# Patient Record
Sex: MALE | Race: WHITE | NOT HISPANIC OR LATINO | Employment: OTHER | URBAN - NONMETROPOLITAN AREA
[De-identification: names, ages, dates, MRNs, and addresses within clinical notes are randomized per-mention and may not be internally consistent; named-entity substitution may affect disease eponyms.]

---

## 2023-05-25 ENCOUNTER — TELEPHONE (OUTPATIENT)
Dept: FAMILY MEDICINE CLINIC | Facility: CLINIC | Age: 73
End: 2023-05-25

## 2023-06-01 ENCOUNTER — OFFICE VISIT (OUTPATIENT)
Dept: FAMILY MEDICINE CLINIC | Facility: CLINIC | Age: 73
End: 2023-06-01

## 2023-06-01 ENCOUNTER — PATIENT ROUNDING (BHMG ONLY) (OUTPATIENT)
Dept: FAMILY MEDICINE CLINIC | Facility: CLINIC | Age: 73
End: 2023-06-01

## 2023-06-01 VITALS
HEART RATE: 85 BPM | BODY MASS INDEX: 37.94 KG/M2 | OXYGEN SATURATION: 95 % | TEMPERATURE: 97.5 F | DIASTOLIC BLOOD PRESSURE: 94 MMHG | WEIGHT: 271 LBS | RESPIRATION RATE: 18 BRPM | HEIGHT: 71 IN | SYSTOLIC BLOOD PRESSURE: 152 MMHG

## 2023-06-01 DIAGNOSIS — M1A.9XX0 CHRONIC GOUT WITHOUT TOPHUS, UNSPECIFIED CAUSE, UNSPECIFIED SITE: ICD-10-CM

## 2023-06-01 DIAGNOSIS — M06.9 RHEUMATOID ARTHRITIS, INVOLVING UNSPECIFIED SITE, UNSPECIFIED WHETHER RHEUMATOID FACTOR PRESENT: ICD-10-CM

## 2023-06-01 DIAGNOSIS — Z76.89 ENCOUNTER TO ESTABLISH CARE: ICD-10-CM

## 2023-06-01 DIAGNOSIS — Z00.00 ANNUAL PHYSICAL EXAM: ICD-10-CM

## 2023-06-01 DIAGNOSIS — Z12.11 COLON CANCER SCREENING: ICD-10-CM

## 2023-06-01 DIAGNOSIS — E78.5 HYPERLIPIDEMIA, UNSPECIFIED HYPERLIPIDEMIA TYPE: ICD-10-CM

## 2023-06-01 DIAGNOSIS — I10 ESSENTIAL HYPERTENSION: Primary | ICD-10-CM

## 2023-06-01 RX ORDER — CLOPIDOGREL BISULFATE 75 MG/1
1 TABLET ORAL DAILY
COMMUNITY

## 2023-06-01 RX ORDER — LANOLIN ALCOHOL/MO/W.PET/CERES
2500 CREAM (GRAM) TOPICAL DAILY
COMMUNITY

## 2023-06-01 RX ORDER — ATORVASTATIN CALCIUM 40 MG/1
1 TABLET, FILM COATED ORAL DAILY
COMMUNITY
Start: 2023-05-30

## 2023-06-01 RX ORDER — MULTIVIT WITH MINERALS/LUTEIN
500 TABLET ORAL DAILY
COMMUNITY

## 2023-06-01 RX ORDER — MILK THISTLE 150 MG
355 CAPSULE ORAL DAILY
COMMUNITY

## 2023-06-01 RX ORDER — MAGNESIUM OXIDE 400 MG/1
400 TABLET ORAL DAILY
COMMUNITY

## 2023-06-01 RX ORDER — GABAPENTIN 300 MG/1
1 CAPSULE ORAL 3 TIMES DAILY
COMMUNITY
Start: 2023-05-12

## 2023-06-01 RX ORDER — TRAMADOL HYDROCHLORIDE 50 MG/1
50 TABLET ORAL 3 TIMES DAILY
COMMUNITY
Start: 2023-05-12

## 2023-06-01 RX ORDER — ZINC GLUCONATE 50 MG
50 TABLET ORAL DAILY
COMMUNITY

## 2023-06-01 RX ORDER — MULTIVIT-MIN/FOLIC AC/COLLAGEN 0.2MG-25MG
2360 TABLET,CHEWABLE ORAL DAILY
COMMUNITY

## 2023-06-01 RX ORDER — ALLOPURINOL 100 MG/1
100 TABLET ORAL DAILY
COMMUNITY

## 2023-06-01 RX ORDER — DIPHENOXYLATE HYDROCHLORIDE AND ATROPINE SULFATE 2.5; .025 MG/1; MG/1
TABLET ORAL DAILY
COMMUNITY

## 2023-06-01 RX ORDER — CARVEDILOL 6.25 MG/1
6.25 TABLET ORAL 2 TIMES DAILY WITH MEALS
COMMUNITY

## 2023-06-01 RX ORDER — TORSEMIDE 100 MG/1
1 TABLET ORAL DAILY
COMMUNITY
Start: 2022-06-08

## 2023-06-01 RX ORDER — ALLOPURINOL 300 MG/1
300 TABLET ORAL DAILY
COMMUNITY

## 2023-06-01 NOTE — PROGRESS NOTES
June 1, 2023    Hello, may I speak with Fer Ch?    My name is Kendell Stanley    I am  with [facility]  [address] [address] [phone].    Before we get started may I verify your date of birth? 1950    I am calling to officially welcome you to our practice and ask about your recent visit. Is this a good time to talk?     YES - I spoke with this patient at check out.    Tell me about your visit with us. What things went well?    Patient said the staff was nice and his wait time was very quick. He said he only lives 5 minutes from the office and is glad he does not have to travel to [place] to see a [facility] doctor. He was very happy with his visit with KELSEY Frias.       We're always looking for ways to make our patients' experiences even better. Do you have recommendations on ways we may improve?      NO    Overall were you satisfied with your first visit to our practice?     YES       I appreciate you taking the time to speak with me today. Is there anything else I can do for you?     NO      Thank you, and have a great day.

## 2023-06-01 NOTE — PROGRESS NOTES
"Chief Complaint   Establish Care (Cont. Care of HTN; hyperlipidemia; chronic pain; gout)    Subjective        Fer Ch presents to [facility] to establish care (annual physical).    Hypertension  This is a chronic problem. Episode onset: 20 years or longer. The problem has been waxing and waning since onset. The problem is uncontrolled. Associated symptoms include headaches, peripheral edema and shortness of breath. Pertinent negatives include no anxiety, blurred vision, chest pain, malaise/fatigue, neck pain, orthopnea, palpitations, PND or sweats. Risk factors for coronary artery disease include male gender, obesity, sedentary lifestyle and dyslipidemia. There is no history of chronic renal disease.   Hyperlipidemia  This is a chronic problem. The current episode started more than 1 year ago. The problem is uncontrolled. Recent lipid tests were reviewed and are high. Exacerbating diseases include obesity. He has no history of chronic renal disease, diabetes, hypothyroidism, liver disease or nephrotic syndrome. Associated symptoms include myalgias and shortness of breath. Pertinent negatives include no chest pain.   Pain  This is a chronic (Arthritis pain (dx with RA)) problem. Episode onset: 20 years or longer. The problem occurs constantly. The problem has been gradually worsening. Associated symptoms include arthralgias, a change in bowel habit, headaches, joint swelling, myalgias and numbness. Pertinent negatives include no abdominal pain, anorexia, chest pain, chills, congestion, coughing, diaphoresis, fatigue, fever, nausea, neck pain, rash, sore throat, swollen glands, urinary symptoms, vertigo, visual change, vomiting or weakness. 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   Other  This is a chronic (Gout) problem. Episode onset: \"years\" The problem has been waxing and waning. Associated symptoms include arthralgias, a " "change in bowel habit, headaches, joint swelling, myalgias and numbness. Pertinent negatives include no abdominal pain, anorexia, chest pain, chills, congestion, coughing, diaphoresis, fatigue, fever, nausea, neck pain, rash, sore throat, swollen glands, urinary symptoms, vertigo, visual change, vomiting or weakness.     Objective   Vital Signs:  /94 (BP Location: Right arm, Patient Position: Sitting, Cuff Size: Adult)   Pulse 85   Temp 97.5 °F (36.4 °C) (Temporal)   Resp 18   Ht 180.3 cm (71\")   Wt 123 kg (271 lb)   SpO2 95%   BMI 37.80 kg/m²   Estimated body mass index is 37.8 kg/m² as calculated from the following:    Height as of this encounter: 180.3 cm (71\").    Weight as of this encounter: 123 kg (271 lb).       Class 2 Severe Obesity (BMI >=35 and <=39.9). Obesity-related health conditions include the following: hypertension, dyslipidemias and GERD. Obesity is unchanged. BMI is is above average; BMI management plan is completed. We discussed portion control and increasing exercise.      Physical Exam  Vitals and nursing note reviewed.   Constitutional:       General: He is awake.      Appearance: Normal appearance.   HENT:      Head: Normocephalic.      Right Ear: Tympanic membrane, ear canal and external ear normal. Decreased hearing noted.      Left Ear: Tympanic membrane, ear canal and external ear normal. Decreased hearing noted.      Nose: Nose normal.      Mouth/Throat:      Lips: Pink.      Mouth: Mucous membranes are moist.      Pharynx: Oropharynx is clear.   Eyes:      General: Lids are normal.      Conjunctiva/sclera: Conjunctivae normal.      Pupils: Pupils are equal, round, and reactive to light.   Cardiovascular:      Rate and Rhythm: Normal rate and regular rhythm.      Heart sounds: Normal heart sounds.   Pulmonary:      Effort: Pulmonary effort is normal.      Breath sounds: Normal breath sounds.   Abdominal:      General: Abdomen is protuberant. Bowel sounds are normal.      " Palpations: Abdomen is soft.      Tenderness: There is no abdominal tenderness.   Musculoskeletal:      Right hand: Tenderness present. Decreased range of motion.      Left hand: Tenderness present. Decreased range of motion.      Cervical back: Normal range of motion and neck supple.      Lumbar back: Spasms and tenderness present.      Right knee: Tenderness present.      Left knee: Tenderness present.   Skin:     General: Skin is warm and dry.      Capillary Refill: Capillary refill takes less than 2 seconds.   Neurological:      Mental Status: He is alert and oriented to person, place, and time.      Cranial Nerves: Cranial nerves 2-12 are intact.      Sensory: Sensation is intact.      Motor: Motor function is intact.      Coordination: Coordination is intact.      Gait: Gait is intact.   Psychiatric:         Attention and Perception: Attention and perception normal.         Mood and Affect: Mood is anxious. Affect is flat.         Speech: Speech normal.         Behavior: Behavior is agitated. Behavior is cooperative.         Thought Content: Thought content normal.         Cognition and Memory: Cognition normal.         Judgment: Judgment normal.        Result Review :  The following data was reviewed by: KELSEY Webb on 06/01/2023:  CMP        9/7/2022    13:58 3/7/2023    14:27   CMP   Total Protein 7.2      7.6        Albumin 4.5      4.7        Total Bilirubin 0.6      0.5        Alkaline Phosphatase 93      86        AST (SGOT) 20      26        ALT (SGPT) 22      41            This result is from an external source.     CBC w/diff        11/22/2022    11:45 3/7/2023    14:27   CBC w/Diff   WBC 8.32      9.38        RBC 3.94      3.98        Hemoglobin 14.7      14.8        Hematocrit 41.2      41.6              105        MCH 37.3      37.2        MCHC 35.7      35.6        RDW 13.0      13.7        Platelets 211      218        Neutrophil Rel % 58.0      61.0        Immature Granulocyte  Rel % 1.0      1.0        Lymphocyte Rel % 25.0      22.0        Monocyte Rel % 11.0      13.0        Eosinophil Rel % 4.0      2.0        Basophil Rel % 1.0      1.0            This result is from an external source.           Assessment and Plan   Diagnoses and all orders for this visit:    1. Essential hypertension (Primary)  -     Ambulatory Referral to Cardiology  -     CBC w AUTO Differential; Future  -     Comprehensive metabolic panel; Future  -     Lipid panel; Future  -     TSH; Future    2. Annual physical exam    3. Colon cancer screening  -     Cologuard - Stool, Per Rectum; Future    4. Encounter to establish care    5. Chronic gout without tophus, unspecified cause, unspecified site  -     Uric acid; Future    6. Hyperlipidemia, unspecified hyperlipidemia type  -     Lipid panel; Future    7. Rheumatoid arthritis, involving unspecified site, unspecified whether rheumatoid factor present  -     Ambulatory Referral to Rheumatology         The preventive exam has been reviewed in detail.  The patient has been fully counseled on preventative guidelines for vaccines, cancer screenings, and other health maintenance needs.   The patient has been counseled on guidelines for maintaining a lifestyle to promote good health and to minimize chronic diseases.  The patient has been assisted with scheduling these healthcare procedures for the coming year and given a written document of health maintenance and anticipatory guidance for age with the AVS.    I spent 30 minutes caring for Fer on this date of service. This time includes time spent by me in the following activities:preparing for the visit, reviewing tests, obtaining and/or reviewing a separately obtained history, performing a medically appropriate examination and/or evaluation , counseling and educating the patient/family/caregiver, ordering medications, tests, or procedures, referring and communicating with other health care professionals , documenting  information in the medical record, independently interpreting results and communicating that information with the patient/family/caregiver and care coordination     Follow Up   Return in about 3 months (around 9/1/2023) for Medicare Wellness.  Patient was given instructions and counseling regarding his condition or for health maintenance advice. Please see specific information pulled into the AVS if appropriate.       This document has been electronically signed by KELSEY Webb  June 2, 2023 21:21 EDT

## 2023-06-02 PROBLEM — M06.9 RHEUMATOID ARTHRITIS: Status: ACTIVE | Noted: 2023-06-02

## 2023-06-03 NOTE — PATIENT INSTRUCTIONS
"Hypertension, Adult  High blood pressure (hypertension) is when the force of blood pumping through the arteries is too strong. The arteries are the blood vessels that carry blood from the heart throughout the body. Hypertension forces the heart to work harder to pump blood and may cause arteries to become narrow or stiff. Untreated or uncontrolled hypertension can cause a heart attack, heart failure, a stroke, kidney disease, and other problems.  A blood pressure reading consists of a higher number over a lower number. Ideally, your blood pressure should be below 120/80. The first (\"top\") number is called the systolic pressure. It is a measure of the pressure in your arteries as your heart beats. The second (\"bottom\") number is called the diastolic pressure. It is a measure of the pressure in your arteries as the heart relaxes.  What are the causes?  The exact cause of this condition is not known. There are some conditions that result in or are related to high blood pressure.  What increases the risk?  Some risk factors for high blood pressure are under your control. The following factors may make you more likely to develop this condition:  Smoking.  Having type 2 diabetes mellitus, high cholesterol, or both.  Not getting enough exercise or physical activity.  Being overweight.  Having too much fat, sugar, calories, or salt (sodium) in your diet.  Drinking too much alcohol.  Some risk factors for high blood pressure may be difficult or impossible to change. Some of these factors include:  Having chronic kidney disease.  Having a family history of high blood pressure.  Age. Risk increases with age.  Race. You may be at higher risk if you are .  Gender. Men are at higher risk than women before age 45. After age 65, women are at higher risk than men.  Having obstructive sleep apnea.  Stress.  What are the signs or symptoms?  High blood pressure may not cause symptoms. Very high blood pressure " (hypertensive crisis) may cause:  Headache.  Anxiety.  Shortness of breath.  Nosebleed.  Nausea and vomiting.  Vision changes.  Severe chest pain.  Seizures.  How is this diagnosed?  This condition is diagnosed by measuring your blood pressure while you are seated, with your arm resting on a flat surface, your legs uncrossed, and your feet flat on the floor. The cuff of the blood pressure monitor will be placed directly against the skin of your upper arm at the level of your heart. It should be measured at least twice using the same arm. Certain conditions can cause a difference in blood pressure between your right and left arms.  Certain factors can cause blood pressure readings to be lower or higher than normal for a short period of time:  When your blood pressure is higher when you are in a health care provider's office than when you are at home, this is called white coat hypertension. Most people with this condition do not need medicines.  When your blood pressure is higher at home than when you are in a health care provider's office, this is called masked hypertension. Most people with this condition may need medicines to control blood pressure.  If you have a high blood pressure reading during one visit or you have normal blood pressure with other risk factors, you may be asked to:  Return on a different day to have your blood pressure checked again.  Monitor your blood pressure at home for 1 week or longer.  If you are diagnosed with hypertension, you may have other blood or imaging tests to help your health care provider understand your overall risk for other conditions.  How is this treated?  This condition is treated by making healthy lifestyle changes, such as eating healthy foods, exercising more, and reducing your alcohol intake. Your health care provider may prescribe medicine if lifestyle changes are not enough to get your blood pressure under control, and if:  Your systolic blood pressure is above  130.  Your diastolic blood pressure is above 80.  Your personal target blood pressure may vary depending on your medical conditions, your age, and other factors.  Follow these instructions at home:  Eating and drinking    Eat a diet that is high in fiber and potassium, and low in sodium, added sugar, and fat. An example eating plan is called the DASH (Dietary Approaches to Stop Hypertension) diet. To eat this way:  Eat plenty of fresh fruits and vegetables. Try to fill one half of your plate at each meal with fruits and vegetables.  Eat whole grains, such as whole-wheat pasta, brown rice, or whole-grain bread. Fill about one fourth of your plate with whole grains.  Eat or drink low-fat dairy products, such as skim milk or low-fat yogurt.  Avoid fatty cuts of meat, processed or cured meats, and poultry with skin. Fill about one fourth of your plate with lean proteins, such as fish, chicken without skin, beans, eggs, or tofu.  Avoid pre-made and processed foods. These tend to be higher in sodium, added sugar, and fat.  Reduce your daily sodium intake. Most people with hypertension should eat less than 1,500 mg of sodium a day.  Do not drink alcohol if:  Your health care provider tells you not to drink.  You are pregnant, may be pregnant, or are planning to become pregnant.  If you drink alcohol:  Limit how much you use to:  0-1 drink a day for women.  0-2 drinks a day for men.  Be aware of how much alcohol is in your drink. In the [place] one drink equals one 12 oz bottle of beer (355 mL), one 5 oz glass of wine (148 mL), or one 1½ oz glass of hard liquor (44 mL).  Lifestyle    Work with your health care provider to maintain a healthy body weight or to lose weight. Ask what an ideal weight is for you.  Get at least 30 minutes of exercise most days of the week. Activities may include walking, swimming, or biking.  Include exercise to strengthen your muscles (resistance exercise), such as Pilates or lifting weights, as  part of your weekly exercise routine. Try to do these types of exercises for 30 minutes at least 3 days a week.  Do not use any products that contain nicotine or tobacco, such as cigarettes, e-cigarettes, and chewing tobacco. If you need help quitting, ask your health care provider.  Monitor your blood pressure at home as told by your health care provider.  Keep all follow-up visits as told by your health care provider. This is important.  Medicines  Take over-the-counter and prescription medicines only as told by your health care provider. Follow directions carefully. Blood pressure medicines must be taken as prescribed.  Do not skip doses of blood pressure medicine. Doing this puts you at risk for problems and can make the medicine less effective.  Ask your health care provider about side effects or reactions to medicines that you should watch for.  Contact a health care provider if you:  Think you are having a reaction to a medicine you are taking.  Have headaches that keep coming back (recurring).  Feel dizzy.  Have swelling in your ankles.  Have trouble with your vision.  Get help right away if you:  Develop a severe headache or confusion.  Have unusual weakness or numbness.  Feel faint.  Have severe pain in your chest or abdomen.  Vomit repeatedly.  Have trouble breathing.  Summary  Hypertension is when the force of blood pumping through your arteries is too strong. If this condition is not controlled, it may put you at risk for serious complications.  Your personal target blood pressure may vary depending on your medical conditions, your age, and other factors. For most people, a normal blood pressure is less than 120/80.  Hypertension is treated with lifestyle changes, medicines, or a combination of both. Lifestyle changes include losing weight, eating a healthy, low-sodium diet, exercising more, and limiting alcohol.  This information is not intended to replace advice given to you by your health care  provider. Make sure you discuss any questions you have with your health care provider.  Document Revised: 08/28/2019 Document Reviewed: 08/28/2019  [employer] Patient Education © 2022 [employer] High Cholesterol  High cholesterol is a condition in which the blood has high levels of a white, waxy substance similar to fat (cholesterol). The liver makes all the cholesterol that the body needs. The human body needs small amounts of cholesterol to help build cells. A person gets extra or excess cholesterol from the food that he or she eats.  The blood carries cholesterol from the liver to the rest of the body. If you have high cholesterol, deposits (plaques) may build up on the walls of your arteries. Arteries are the blood vessels that carry blood away from your heart. These plaques make the arteries narrow and stiff.  Cholesterol plaques increase your risk for heart attack and stroke. Work with your health care provider to keep your cholesterol levels in a healthy range.  What increases the risk?  The following factors may make you more likely to develop this condition:  Eating foods that are high in animal fat (saturated fat) or cholesterol.  Being overweight.  Not getting enough exercise.  A family history of high cholesterol (familial hypercholesterolemia).  Use of tobacco products.  Having diabetes.  What are the signs or symptoms?  In most cases, high cholesterol does not usually cause any symptoms.  In severe cases, very high cholesterol levels can cause:  Fatty bumps under the skin (xanthomas).  A white or gray ring around the black center (pupil) of the eye.  How is this diagnosed?  This condition may be diagnosed based on the results of a blood test.  If you are older than 20 years of age, your health care provider may check your cholesterol levels every 4-6 years.  You may be checked more often if you have high cholesterol or other risk factors for heart disease.  The blood test for cholesterol  "measures:  \"Bad\" cholesterol, or LDL cholesterol. This is the main type of cholesterol that causes heart disease. The desired level is less than 100 mg/dL (2.59 mmol/L).  \"Good\" cholesterol, or HDL cholesterol. HDL helps protect against heart disease by cleaning the arteries and carrying the LDL to the liver for processing. The desired level for HDL is 60 mg/dL (1.55 mmol/L) or higher.  Triglycerides. These are fats that your body can store or burn for energy. The desired level is less than 150 mg/dL (1.69 mmol/L).  Total cholesterol. This measures the total amount of cholesterol in your blood and includes LDL, HDL, and triglycerides. The desired level is less than 200 mg/dL (5.17 mmol/L).  How is this treated?  Treatment for high cholesterol starts with lifestyle changes, such as diet and exercise.  Diet changes. You may be asked to eat foods that have more fiber and less saturated fats or added sugar.  Lifestyle changes. These may include regular exercise, maintaining a healthy weight, and quitting use of tobacco products.  Medicines. These are given when diet and lifestyle changes have not worked. You may be prescribed a statin medicine to help lower your cholesterol levels.  Follow these instructions at home:  Eating and drinking    Eat a healthy, balanced diet. This diet includes:  Daily servings of a variety of fresh, frozen, or canned fruits and vegetables.  Daily servings of whole grain foods that are rich in fiber.  Foods that are low in saturated fats and trans fats. These include poultry and fish without skin, lean cuts of meat, and low-fat dairy products.  A variety of fish, especially oily fish that contain omega-3 fatty acids. Aim to eat fish at least 2 times a week.  Avoid foods and drinks that have added sugar.  Use healthy cooking methods, such as roasting, grilling, broiling, baking, poaching, steaming, and stir-frying. Do not watkins your food except for stir-frying.  If you drink alcohol:  Limit how " "much you have to:  0-1 drink a day for women who are not pregnant.  0-2 drinks a day for men.  Know how much alcohol is in a drink. In the [place] one drink equals one 12 oz bottle of beer (355 mL), one 5 oz glass of wine (148 mL), or one 1½ oz glass of hard liquor (44 mL).  Lifestyle    Get regular exercise. Aim to exercise for a total of 150 minutes a week. Increase your activity level by doing activities such as gardening, walking, and taking the stairs.  Do not use any products that contain nicotine or tobacco. These products include cigarettes, chewing tobacco, and vaping devices, such as e-cigarettes. If you need help quitting, ask your health care provider.  General instructions  Take over-the-counter and prescription medicines only as told by your health care provider.  Keep all follow-up visits. This is important.  Where to find more information  American Heart Association: www.heart.org  National Heart, Lung, and Blood [place]: www.nhlbi.nih.gov  Contact a health care provider if:  You have trouble achieving or maintaining a healthy diet or weight.  You are starting an exercise program.  You are unable to stop smoking.  Get help right away if:  You have chest pain.  You have trouble breathing.  You have discomfort or pain in your jaw, neck, back, shoulder, or arm.  You have any symptoms of a stroke. \"BE FAST\" is an easy way to remember the main warning signs of a stroke:  B - Balance. Signs are dizziness, sudden trouble walking, or loss of balance.  E - Eyes. Signs are trouble seeing or a sudden change in vision.  F - Face. Signs are sudden weakness or numbness of the face, or the face or eyelid drooping on one side.  A - Arms. Signs are weakness or numbness in an arm. This happens suddenly and usually on one side of the body.  S - Speech. Signs are sudden trouble speaking, slurred speech, or trouble understanding what people say.  T - Time. Time to call emergency services. Write down what time symptoms " started.  You have other signs of a stroke, such as:  A sudden, severe headache with no known cause.  Nausea or vomiting.  Seizure.  These symptoms may represent a serious problem that is an emergency. Do not wait to see if the symptoms will go away. Get medical help right away. Call your local emergency services (911 in the [place] Do not drive yourself to the hospital.  Summary  Cholesterol plaques increase your risk for heart attack and stroke. Work with your health care provider to keep your cholesterol levels in a healthy range.  Eat a healthy, balanced diet, get regular exercise, and maintain a healthy weight.  Do not use any products that contain nicotine or tobacco. These products include cigarettes, chewing tobacco, and vaping devices, such as e-cigarettes.  Get help right away if you have any symptoms of a stroke.  This information is not intended to replace advice given to you by your health care provider. Make sure you discuss any questions you have with your health care provider.  Document Revised: 03/03/2022 Document Reviewed: 02/21/2022  [employer] Patient Education © 2022 [employer]

## 2023-06-06 ENCOUNTER — OFFICE VISIT (OUTPATIENT)
Dept: CARDIOLOGY | Facility: CLINIC | Age: 73
End: 2023-06-06
Payer: MEDICARE

## 2023-06-06 VITALS
HEART RATE: 95 BPM | DIASTOLIC BLOOD PRESSURE: 85 MMHG | WEIGHT: 275 LBS | BODY MASS INDEX: 38.5 KG/M2 | OXYGEN SATURATION: 95 % | HEIGHT: 71 IN | SYSTOLIC BLOOD PRESSURE: 129 MMHG

## 2023-06-06 DIAGNOSIS — R06.02 SHORTNESS OF BREATH: ICD-10-CM

## 2023-06-06 DIAGNOSIS — I25.10 CORONARY ARTERY DISEASE INVOLVING NATIVE CORONARY ARTERY OF NATIVE HEART WITHOUT ANGINA PECTORIS: Primary | ICD-10-CM

## 2023-06-06 DIAGNOSIS — R60.0 BILATERAL LOWER EXTREMITY EDEMA: ICD-10-CM

## 2023-06-06 PROCEDURE — 3074F SYST BP LT 130 MM HG: CPT | Performed by: PHYSICIAN ASSISTANT

## 2023-06-06 PROCEDURE — 99204 OFFICE O/P NEW MOD 45 MIN: CPT | Performed by: PHYSICIAN ASSISTANT

## 2023-06-06 PROCEDURE — 3079F DIAST BP 80-89 MM HG: CPT | Performed by: PHYSICIAN ASSISTANT

## 2023-06-06 NOTE — LETTER
June 6, 2023       No Recipients    Patient: Fer Ch   YOB: 1950   Date of Visit: 6/6/2023       Dear KELSEY Webb    Fer Ch was in my office today. Below is a copy of my note.    If you have questions, please do not hesitate to call me. I look forward to following Fer along with you.         Sincerely,        JOSE DE JESUS Steele        CC:   No Recipients      Problem list     Subjective  Fer Ch is a 73 y.o. male     Chief Complaint   Patient presents with   • Hypertension     ESSENTIAL HTN, REFERRAL FROM [facility]    Problem list  1.  Coronary artery disease  1.  History of stenting x3 in [place], inadequate data  2.  Preserved systolic function  3.  Hypertension  4.  Dyslipidemia    HPI    Patient is a 73-year-old male who presents to the office to be evaluated.  Patient has seen cardiology in the past and has moved to the area and has been here approximately 1 month.    He has done well.  He does not experience any chest pain or pressure.  However, he does get short of breath.  He notices this when trying to do activity but it is mild.  He does not describe PND orthopnea.  He does not describe any palpitations or dysrhythmic symptoms.  He is stable otherwise.      Current Outpatient Medications on File Prior to Visit   Medication Sig Dispense Refill   • allopurinol (ZYLOPRIM) 100 MG tablet Take 1 tablet by mouth Daily.     • allopurinol (ZYLOPRIM) 300 MG tablet Take 1 tablet by mouth Daily.     • ASPIRIN 81 PO Take 1 tablet by mouth Daily.     • atorvastatin (LIPITOR) 40 MG tablet Take 1 tablet by mouth Daily.     • carvedilol (COREG) 6.25 MG tablet Take 1 tablet by mouth 2 (Two) Times a Day With Meals.     • gabapentin (NEURONTIN) 300 MG capsule Take 1 capsule by mouth 3 (Three) Times a Day.     • magnesium oxide (MAG-OX) 400 MG tablet Take 1 tablet by mouth Daily.     • Metoprolol-hydroCHLOROthiazide (DUTOPROL PO) Take 1 tablet by mouth Daily.     •  multivitamin (THERAGRAN) tablet tablet Take  by mouth Daily.     • NON FORMULARY Take 1,000 mg by mouth Daily. Superbeets     • Quercetin 500 MG capsule Take 355 mg by mouth Daily.     • torsemide (DEMADEX) 100 MG tablet Take 1 tablet by mouth Daily.     • traMADol (ULTRAM) 50 MG tablet Take 1 tablet by mouth 3 (Three) Times a Day. for 30 days.     • Turmeric-Larissa 150-25 MG chewable tablet Chew 2,360 mg Daily. Turmeric, Bioerine, Garlic, larissa.     • vitamin B-12 (CYANOCOBALAMIN) 1000 MCG tablet Take 2.5 tablets by mouth Daily.     • vitamin C (ASCORBIC ACID) 250 MG tablet Take 2 tablets by mouth Daily.     • vitamin D3 125 MCG (5000 UT) capsule capsule Take 1 capsule by mouth Daily.     • Zinc 50 MG tablet Take 1 tablet by mouth Daily.     • [DISCONTINUED] clopidogrel (PLAVIX) 75 MG tablet Take 1 tablet by mouth Daily.       No current facility-administered medications on file prior to visit.       Atorvastatin and Potassium-containing compounds    Past Medical History:   Diagnosis Date   • Bladder cancer     Finished tx in 12/2022   • CAD (coronary artery disease)    • Diastolic congestive heart failure    • Dyspnea    • Hypertension    • Knee pain    • Lymphedema        Social History     Socioeconomic History   • Marital status:    Tobacco Use   • Smoking status: Never   • Smokeless tobacco: Never   Substance and Sexual Activity   • Alcohol use: Not Currently   • Drug use: Never   • Sexual activity: Defer       Family History   Problem Relation Age of Onset   • Cancer Father        Review of Systems   Constitutional:  Negative for activity change, appetite change, chills and fever.   HENT: Negative.  Negative for ear discharge, mouth sores, sinus pressure, sinus pain, sneezing and tinnitus.    Eyes:  Negative for pain, redness, itching and visual disturbance.   Respiratory:  Positive for shortness of breath. Negative for cough, choking and wheezing.    Cardiovascular:  Positive for leg swelling.  "Negative for chest pain and palpitations.   Gastrointestinal:  Negative for blood in stool, constipation, diarrhea and nausea.   Genitourinary: Negative.  Negative for difficulty urinating.   Musculoskeletal:  Negative for arthralgias, back pain and neck pain.   Skin:  Negative for rash and wound.   Neurological:  Positive for numbness (HANDS ARE NUMB, LEGS ARE DUMB). Negative for dizziness and weakness.   Psychiatric/Behavioral:  Positive for sleep disturbance.      Objective  Vitals:    06/06/23 1008   BP: 129/85   Pulse: 95   SpO2: 95%   Weight: 125 kg (275 lb)   Height: 180.3 cm (70.98\")      /85   Pulse 95   Ht 180.3 cm (70.98\")   Wt 125 kg (275 lb)   SpO2 95%   BMI 38.37 kg/m²     Lab Results (most recent)       None            Physical Exam  Vitals and nursing note reviewed.   Constitutional:       General: He is not in acute distress.     Appearance: Normal appearance. He is well-developed.   HENT:      Head: Normocephalic and atraumatic.   Eyes:      General: No scleral icterus.        Right eye: No discharge.         Left eye: No discharge.      Conjunctiva/sclera: Conjunctivae normal.   Neck:      Vascular: No carotid bruit.   Cardiovascular:      Rate and Rhythm: Normal rate and regular rhythm.      Heart sounds: Normal heart sounds. No murmur heard.    No friction rub. No gallop.   Pulmonary:      Effort: Pulmonary effort is normal. No respiratory distress.      Breath sounds: Normal breath sounds. No wheezing or rales.   Chest:      Chest wall: No tenderness.   Musculoskeletal:      Right lower leg: Edema present.      Left lower leg: Edema present.   Skin:     General: Skin is warm and dry.      Coloration: Skin is not pale.      Findings: No erythema or rash.   Neurological:      Mental Status: He is alert and oriented to person, place, and time.      Cranial Nerves: No cranial nerve deficit.   Psychiatric:         Behavior: Behavior normal.       Procedure  Procedures       Assessment & " Plan    Problems Addressed this Visit          Cardiac and Vasculature    Coronary artery disease involving native coronary artery of native heart without angina pectoris - Primary    Relevant Orders    Stress Test With Myocardial Perfusion One Day       Pulmonary and Pneumonias    Shortness of breath    Relevant Orders    Stress Test With Myocardial Perfusion One Day       Symptoms and Signs    Bilateral lower extremity edema    Relevant Orders    Stress Test With Myocardial Perfusion One Day     Diagnoses         Codes Comments    Coronary artery disease involving native coronary artery of native heart without angina pectoris    -  Primary ICD-10-CM: I25.10  ICD-9-CM: 414.01     Shortness of breath     ICD-10-CM: R06.02  ICD-9-CM: 786.05     Bilateral lower extremity edema     ICD-10-CM: R60.0  ICD-9-CM: 782.3           Recommendation  1.  Patient is a 73-year-old male who presents to the office for evaluation with history of coronary disease.  Patient feels well but does have a degree of dyspnea.  He had an echocardiogram in April at [facility] and has normal systolic function with no critical valve disease.  I will schedule stress testing to exclude ischemic component of his dyspnea.  Otherwise, he is on antiplatelet and statin therapy.  He has labs performed by primary.    2.  Patient on diuretic therapy to help in regards to lower extremity edema.  We will continue at this time.    3.  We will see patient back for follow-up testing and recommend further.  Follow-up with primary as scheduled.              Fer Ch  reports that he has never smoked. He has never used smokeless tobacco..           Advance Care Planning   ACP discussion was declined by the patient. Patient does not have an advance directive, declines further assistance.               Electronically signed by:

## 2023-06-06 NOTE — PROGRESS NOTES
Problem list     Subjective   Fer Ch is a 73 y.o. male     Chief Complaint   Patient presents with    Hypertension     ESSENTIAL HTN, REFERRAL FROM [facility]    Problem list  1.  Coronary artery disease  1.  History of stenting x3 in [place], inadequate data  2.  Preserved systolic function  3.  Hypertension  4.  Dyslipidemia    HPI    Patient is a 73-year-old male who presents to the office to be evaluated.  Patient has seen cardiology in the past and has moved to the area and has been here approximately 1 month.    He has done well.  He does not experience any chest pain or pressure.  However, he does get short of breath.  He notices this when trying to do activity but it is mild.  He does not describe PND orthopnea.  He does not describe any palpitations or dysrhythmic symptoms.  He is stable otherwise.      Current Outpatient Medications on File Prior to Visit   Medication Sig Dispense Refill    allopurinol (ZYLOPRIM) 100 MG tablet Take 1 tablet by mouth Daily.      allopurinol (ZYLOPRIM) 300 MG tablet Take 1 tablet by mouth Daily.      ASPIRIN 81 PO Take 1 tablet by mouth Daily.      atorvastatin (LIPITOR) 40 MG tablet Take 1 tablet by mouth Daily.      carvedilol (COREG) 6.25 MG tablet Take 1 tablet by mouth 2 (Two) Times a Day With Meals.      gabapentin (NEURONTIN) 300 MG capsule Take 1 capsule by mouth 3 (Three) Times a Day.      magnesium oxide (MAG-OX) 400 MG tablet Take 1 tablet by mouth Daily.      Metoprolol-hydroCHLOROthiazide (DUTOPROL PO) Take 1 tablet by mouth Daily.      multivitamin (THERAGRAN) tablet tablet Take  by mouth Daily.      NON FORMULARY Take 1,000 mg by mouth Daily. Superbeets      Quercetin 500 MG capsule Take 355 mg by mouth Daily.      torsemide (DEMADEX) 100 MG tablet Take 1 tablet by mouth Daily.      traMADol (ULTRAM) 50 MG tablet Take 1 tablet by mouth 3 (Three) Times a Day. for 30 days.      Turmeric-Larissa 150-25 MG chewable tablet Chew 2,360 mg Daily.  Turmeric, Bioerine, Garlic, josephine.      vitamin B-12 (CYANOCOBALAMIN) 1000 MCG tablet Take 2.5 tablets by mouth Daily.      vitamin C (ASCORBIC ACID) 250 MG tablet Take 2 tablets by mouth Daily.      vitamin D3 125 MCG (5000 UT) capsule capsule Take 1 capsule by mouth Daily.      Zinc 50 MG tablet Take 1 tablet by mouth Daily.      [DISCONTINUED] clopidogrel (PLAVIX) 75 MG tablet Take 1 tablet by mouth Daily.       No current facility-administered medications on file prior to visit.       Atorvastatin and Potassium-containing compounds    Past Medical History:   Diagnosis Date    Bladder cancer     Finished tx in 12/2022    CAD (coronary artery disease)     Diastolic congestive heart failure     Dyspnea     Hypertension     Knee pain     Lymphedema        Social History     Socioeconomic History    Marital status:    Tobacco Use    Smoking status: Never    Smokeless tobacco: Never   Substance and Sexual Activity    Alcohol use: Not Currently    Drug use: Never    Sexual activity: Defer       Family History   Problem Relation Age of Onset    Cancer Father        Review of Systems   Constitutional:  Negative for activity change, appetite change, chills and fever.   HENT: Negative.  Negative for ear discharge, mouth sores, sinus pressure, sinus pain, sneezing and tinnitus.    Eyes:  Negative for pain, redness, itching and visual disturbance.   Respiratory:  Positive for shortness of breath. Negative for cough, choking and wheezing.    Cardiovascular:  Positive for leg swelling. Negative for chest pain and palpitations.   Gastrointestinal:  Negative for blood in stool, constipation, diarrhea and nausea.   Genitourinary: Negative.  Negative for difficulty urinating.   Musculoskeletal:  Negative for arthralgias, back pain and neck pain.   Skin:  Negative for rash and wound.   Neurological:  Positive for numbness (HANDS ARE NUMB, LEGS ARE DUMB). Negative for dizziness and weakness.   Psychiatric/Behavioral:   "Positive for sleep disturbance.      Objective   Vitals:    06/06/23 1008   BP: 129/85   Pulse: 95   SpO2: 95%   Weight: 125 kg (275 lb)   Height: 180.3 cm (70.98\")      /85   Pulse 95   Ht 180.3 cm (70.98\")   Wt 125 kg (275 lb)   SpO2 95%   BMI 38.37 kg/m²     Lab Results (most recent)       None            Physical Exam  Vitals and nursing note reviewed.   Constitutional:       General: He is not in acute distress.     Appearance: Normal appearance. He is well-developed.   HENT:      Head: Normocephalic and atraumatic.   Eyes:      General: No scleral icterus.        Right eye: No discharge.         Left eye: No discharge.      Conjunctiva/sclera: Conjunctivae normal.   Neck:      Vascular: No carotid bruit.   Cardiovascular:      Rate and Rhythm: Normal rate and regular rhythm.      Heart sounds: Normal heart sounds. No murmur heard.    No friction rub. No gallop.   Pulmonary:      Effort: Pulmonary effort is normal. No respiratory distress.      Breath sounds: Normal breath sounds. No wheezing or rales.   Chest:      Chest wall: No tenderness.   Musculoskeletal:      Right lower leg: Edema present.      Left lower leg: Edema present.   Skin:     General: Skin is warm and dry.      Coloration: Skin is not pale.      Findings: No erythema or rash.   Neurological:      Mental Status: He is alert and oriented to person, place, and time.      Cranial Nerves: No cranial nerve deficit.   Psychiatric:         Behavior: Behavior normal.       Procedure   Procedures       Assessment & Plan     Problems Addressed this Visit          Cardiac and Vasculature    Coronary artery disease involving native coronary artery of native heart without angina pectoris - Primary    Relevant Orders    Stress Test With Myocardial Perfusion One Day       Pulmonary and Pneumonias    Shortness of breath    Relevant Orders    Stress Test With Myocardial Perfusion One Day       Symptoms and Signs    Bilateral lower extremity edema    " Relevant Orders    Stress Test With Myocardial Perfusion One Day     Diagnoses         Codes Comments    Coronary artery disease involving native coronary artery of native heart without angina pectoris    -  Primary ICD-10-CM: I25.10  ICD-9-CM: 414.01     Shortness of breath     ICD-10-CM: R06.02  ICD-9-CM: 786.05     Bilateral lower extremity edema     ICD-10-CM: R60.0  ICD-9-CM: 782.3           Recommendation  1.  Patient is a 73-year-old male who presents to the office for evaluation with history of coronary disease.  Patient feels well but does have a degree of dyspnea.  He had an echocardiogram in April at [facility] and has normal systolic function with no critical valve disease.  I will schedule stress testing to exclude ischemic component of his dyspnea.  Otherwise, he is on antiplatelet and statin therapy.  He has labs performed by primary.    2.  Patient on diuretic therapy to help in regards to lower extremity edema.  We will continue at this time.    3.  We will see patient back for follow-up testing and recommend further.  Follow-up with primary as scheduled.              Fer Ch  reports that he has never smoked. He has never used smokeless tobacco..           Advance Care Planning   ACP discussion was declined by the patient. Patient does not have an advance directive, declines further assistance.               Electronically signed by:

## 2023-06-07 ENCOUNTER — LAB (OUTPATIENT)
Dept: FAMILY MEDICINE CLINIC | Facility: CLINIC | Age: 73
End: 2023-06-07
Payer: MEDICARE

## 2023-06-07 DIAGNOSIS — E78.5 HYPERLIPIDEMIA, UNSPECIFIED HYPERLIPIDEMIA TYPE: ICD-10-CM

## 2023-06-07 DIAGNOSIS — I10 ESSENTIAL HYPERTENSION: ICD-10-CM

## 2023-06-07 DIAGNOSIS — M1A.9XX0 CHRONIC GOUT WITHOUT TOPHUS, UNSPECIFIED CAUSE, UNSPECIFIED SITE: ICD-10-CM

## 2023-06-07 PROCEDURE — 80061 LIPID PANEL: CPT | Performed by: NURSE PRACTITIONER

## 2023-06-07 PROCEDURE — 80053 COMPREHEN METABOLIC PANEL: CPT | Performed by: NURSE PRACTITIONER

## 2023-06-07 PROCEDURE — 85007 BL SMEAR W/DIFF WBC COUNT: CPT | Performed by: NURSE PRACTITIONER

## 2023-06-07 PROCEDURE — 85025 COMPLETE CBC W/AUTO DIFF WBC: CPT | Performed by: NURSE PRACTITIONER

## 2023-06-07 PROCEDURE — 84550 ASSAY OF BLOOD/URIC ACID: CPT | Performed by: NURSE PRACTITIONER

## 2023-06-07 PROCEDURE — 84443 ASSAY THYROID STIM HORMONE: CPT | Performed by: NURSE PRACTITIONER

## 2023-06-08 LAB
ALBUMIN SERPL-MCNC: 4.3 G/DL (ref 3.5–5.2)
ALBUMIN/GLOB SERPL: 1.5 G/DL
ALP SERPL-CCNC: 85 U/L (ref 39–117)
ALT SERPL W P-5'-P-CCNC: 36 U/L (ref 1–41)
ANION GAP SERPL CALCULATED.3IONS-SCNC: 12 MMOL/L (ref 5–15)
ANISOCYTOSIS BLD QL: ABNORMAL
AST SERPL-CCNC: 21 U/L (ref 1–40)
BILIRUB SERPL-MCNC: 0.4 MG/DL (ref 0–1.2)
BUN SERPL-MCNC: 23 MG/DL (ref 8–23)
BUN/CREAT SERPL: 15.8 (ref 7–25)
CALCIUM SPEC-SCNC: 9.1 MG/DL (ref 8.6–10.5)
CHLORIDE SERPL-SCNC: 98 MMOL/L (ref 98–107)
CHOLEST SERPL-MCNC: 211 MG/DL (ref 0–200)
CO2 SERPL-SCNC: 29 MMOL/L (ref 22–29)
CREAT SERPL-MCNC: 1.46 MG/DL (ref 0.76–1.27)
DEPRECATED RDW RBC AUTO: 50.3 FL (ref 37–54)
EGFRCR SERPLBLD CKD-EPI 2021: 51.1 ML/MIN/1.73
EOSINOPHIL # BLD MANUAL: 0.26 10*3/MM3 (ref 0–0.4)
EOSINOPHIL NFR BLD MANUAL: 3 % (ref 0.3–6.2)
ERYTHROCYTE [DISTWIDTH] IN BLOOD BY AUTOMATED COUNT: 13.2 % (ref 12.3–15.4)
GLOBULIN UR ELPH-MCNC: 2.9 GM/DL
GLUCOSE SERPL-MCNC: 138 MG/DL (ref 65–99)
HCT VFR BLD AUTO: 39.8 % (ref 37.5–51)
HDLC SERPL-MCNC: 31 MG/DL (ref 40–60)
HGB BLD-MCNC: 14.3 G/DL (ref 13–17.7)
LDLC SERPL CALC-MCNC: 114 MG/DL (ref 0–100)
LDLC/HDLC SERPL: 3.35 {RATIO}
LYMPHOCYTES # BLD MANUAL: 1.55 10*3/MM3 (ref 0.7–3.1)
LYMPHOCYTES NFR BLD MANUAL: 14.1 % (ref 5–12)
MCH RBC QN AUTO: 37.8 PG (ref 26.6–33)
MCHC RBC AUTO-ENTMCNC: 35.9 G/DL (ref 31.5–35.7)
MCV RBC AUTO: 105.3 FL (ref 79–97)
MICROCYTES BLD QL: ABNORMAL
MONOCYTES # BLD: 1.2 10*3/MM3 (ref 0.1–0.9)
NEUTROPHILS # BLD AUTO: 5.49 10*3/MM3 (ref 1.7–7)
NEUTROPHILS NFR BLD MANUAL: 64.6 % (ref 42.7–76)
PLAT MORPH BLD: NORMAL
PLATELET # BLD AUTO: 182 10*3/MM3 (ref 140–450)
PMV BLD AUTO: 10.9 FL (ref 6–12)
POTASSIUM SERPL-SCNC: 3.5 MMOL/L (ref 3.5–5.2)
PROT SERPL-MCNC: 7.2 G/DL (ref 6–8.5)
RBC # BLD AUTO: 3.78 10*6/MM3 (ref 4.14–5.8)
SODIUM SERPL-SCNC: 139 MMOL/L (ref 136–145)
TRIGL SERPL-MCNC: 380 MG/DL (ref 0–150)
TSH SERPL DL<=0.05 MIU/L-ACNC: 1.34 UIU/ML (ref 0.27–4.2)
URATE SERPL-MCNC: 7.7 MG/DL (ref 3.4–7)
VARIANT LYMPHS NFR BLD MANUAL: 18.2 % (ref 19.6–45.3)
VLDLC SERPL-MCNC: 66 MG/DL (ref 5–40)
WBC MORPH BLD: NORMAL
WBC NRBC COR # BLD: 8.5 10*3/MM3 (ref 3.4–10.8)

## 2023-06-09 ENCOUNTER — HOSPITAL ENCOUNTER (OUTPATIENT)
Dept: CARDIOLOGY | Facility: HOSPITAL | Age: 73
Discharge: HOME | End: 2023-06-09
Payer: MEDICARE

## 2023-06-09 DIAGNOSIS — I25.10 CORONARY ARTERY DISEASE INVOLVING NATIVE CORONARY ARTERY OF NATIVE HEART WITHOUT ANGINA PECTORIS: ICD-10-CM

## 2023-06-09 DIAGNOSIS — R06.02 SHORTNESS OF BREATH: ICD-10-CM

## 2023-06-09 DIAGNOSIS — R60.0 BILATERAL LOWER EXTREMITY EDEMA: ICD-10-CM

## 2023-06-09 PROCEDURE — A9500 TC99M SESTAMIBI: HCPCS | Performed by: INTERNAL MEDICINE

## 2023-06-09 PROCEDURE — 78452 HT MUSCLE IMAGE SPECT MULT: CPT

## 2023-06-09 PROCEDURE — 25010000002 REGADENOSON 0.4 MG/5ML SOLUTION: Performed by: INTERNAL MEDICINE

## 2023-06-09 PROCEDURE — 0 TECHNETIUM SESTAMIBI: Performed by: INTERNAL MEDICINE

## 2023-06-09 PROCEDURE — 93017 CV STRESS TEST TRACING ONLY: CPT

## 2023-06-09 RX ORDER — REGADENOSON 0.08 MG/ML
0.4 INJECTION, SOLUTION INTRAVENOUS
Status: COMPLETED | OUTPATIENT
Start: 2023-06-09 | End: 2023-06-09

## 2023-06-09 RX ADMIN — REGADENOSON 0.4 MG: 0.08 INJECTION, SOLUTION INTRAVENOUS at 14:07

## 2023-06-09 RX ADMIN — TECHNETIUM TC 99M SESTAMIBI 1 DOSE: 1 INJECTION INTRAVENOUS at 13:26

## 2023-06-09 RX ADMIN — TECHNETIUM TC 99M SESTAMIBI 1 DOSE: 1 INJECTION INTRAVENOUS at 14:07

## 2023-06-11 LAB
BH CV REST NUCLEAR ISOTOPE DOSE: 10 MCI
BH CV STRESS COMMENTS STAGE 1: NORMAL
BH CV STRESS DOSE REGADENOSON STAGE 1: 0.4
BH CV STRESS DURATION MIN STAGE 1: 0
BH CV STRESS DURATION SEC STAGE 1: 10
BH CV STRESS NUCLEAR ISOTOPE DOSE: 30 MCI
BH CV STRESS PROTOCOL 1: NORMAL
BH CV STRESS RECOVERY BP: NORMAL MMHG
BH CV STRESS RECOVERY HR: 86 BPM
BH CV STRESS STAGE 1: 1
MAXIMAL PREDICTED HEART RATE: 149 BPM
PERCENT MAX PREDICTED HR: 56.38 %
STRESS BASELINE BP: NORMAL MMHG
STRESS BASELINE HR: 84 BPM
STRESS PERCENT HR: 66 %
STRESS POST PEAK BP: NORMAL MMHG
STRESS POST PEAK HR: 84 BPM
STRESS TARGET HR: 127 BPM

## 2023-06-13 ENCOUNTER — TELEPHONE (OUTPATIENT)
Dept: CARDIOLOGY | Facility: CLINIC | Age: 73
End: 2023-06-13
Payer: MEDICARE

## 2023-06-13 NOTE — TELEPHONE ENCOUNTER
STRESS  Pt notified of no acute findings. Provider will discuss results at f/u. Pt reminded of appt date and time.  ----- Message from JOSE DE JESUS Elizondo sent at 6/13/2023  1:17 PM EDT -----  Routine follow-up

## 2023-08-03 DIAGNOSIS — E78.5 HYPERLIPIDEMIA, UNSPECIFIED HYPERLIPIDEMIA TYPE: ICD-10-CM

## 2023-08-03 RX ORDER — FENOFIBRATE 48 MG/1
TABLET, COATED ORAL
Qty: 90 TABLET | Refills: 0 | Status: SHIPPED | OUTPATIENT
Start: 2023-08-03

## 2023-08-04 ENCOUNTER — TELEPHONE (OUTPATIENT)
Dept: FAMILY MEDICINE CLINIC | Facility: CLINIC | Age: 73
End: 2023-08-04
Payer: MEDICARE

## 2023-08-07 RX ORDER — ALLOPURINOL 100 MG/1
100 TABLET ORAL DAILY
Qty: 90 TABLET | Refills: 0 | Status: SHIPPED | OUTPATIENT
Start: 2023-08-07

## 2023-08-15 DIAGNOSIS — G89.4 CHRONIC PAIN SYNDROME: Primary | ICD-10-CM

## 2023-09-01 ENCOUNTER — OFFICE VISIT (OUTPATIENT)
Dept: FAMILY MEDICINE CLINIC | Facility: CLINIC | Age: 73
End: 2023-09-01
Payer: MEDICARE

## 2023-09-01 VITALS
SYSTOLIC BLOOD PRESSURE: 132 MMHG | BODY MASS INDEX: 38.16 KG/M2 | TEMPERATURE: 97.5 F | OXYGEN SATURATION: 91 % | RESPIRATION RATE: 18 BRPM | DIASTOLIC BLOOD PRESSURE: 82 MMHG | HEIGHT: 71 IN | HEART RATE: 101 BPM | WEIGHT: 272.6 LBS

## 2023-09-01 DIAGNOSIS — Z00.00 ENCOUNTER FOR SUBSEQUENT ANNUAL WELLNESS VISIT (AWV) IN MEDICARE PATIENT: Primary | ICD-10-CM

## 2023-09-01 DIAGNOSIS — R41.3 MEMORY LOSS: ICD-10-CM

## 2023-09-01 PROCEDURE — 1170F FXNL STATUS ASSESSED: CPT | Performed by: NURSE PRACTITIONER

## 2023-09-01 PROCEDURE — 1160F RVW MEDS BY RX/DR IN RCRD: CPT | Performed by: NURSE PRACTITIONER

## 2023-09-01 PROCEDURE — 1159F MED LIST DOCD IN RCRD: CPT | Performed by: NURSE PRACTITIONER

## 2023-09-01 PROCEDURE — 3079F DIAST BP 80-89 MM HG: CPT | Performed by: NURSE PRACTITIONER

## 2023-09-01 PROCEDURE — G0439 PPPS, SUBSEQ VISIT: HCPCS | Performed by: NURSE PRACTITIONER

## 2023-09-01 PROCEDURE — 3075F SYST BP GE 130 - 139MM HG: CPT | Performed by: NURSE PRACTITIONER

## 2023-09-01 RX ORDER — TRAZODONE HYDROCHLORIDE 50 MG/1
50 TABLET ORAL AS NEEDED
COMMUNITY
Start: 2023-06-26

## 2023-09-01 NOTE — PROGRESS NOTES
The ABCs of the Annual Wellness Visit  Subsequent Medicare Wellness Visit    Subjective      Fer Ch is a 73 y.o. male who presents for a Subsequent Medicare Wellness Visit.    The following portions of the patient's history were reviewed and   updated as appropriate: allergies, current medications, past family history, past medical history, past social history, past surgical history, and problem list.    Compared to one year ago, the patient feels his physical   health is worse.    Compared to one year ago, the patient feels his mental   health is worse.    Recent Hospitalizations:  He was not admitted to the hospital during the last year.       Current Medical Providers:  Patient Care Team:  Cielo Nunez APRN as PCP - General (Family Medicine)    Outpatient Medications Prior to Visit   Medication Sig Dispense Refill    allopurinol (ZYLOPRIM) 100 MG tablet Take 1 tablet by mouth Daily. 90 tablet 0    allopurinol (ZYLOPRIM) 300 MG tablet Take 1 tablet by mouth Daily.      ASPIRIN 81 PO Take 1 tablet by mouth Daily.      atorvastatin (LIPITOR) 40 MG tablet Take 1 tablet by mouth Daily.      carvedilol (COREG) 6.25 MG tablet Take 1 tablet by mouth 2 (Two) Times a Day With Meals.      fenofibrate (TRICOR) 48 MG tablet TAKE 1 TABLET BY MOUTH EVERY DAY 90 tablet 0    gabapentin (NEURONTIN) 300 MG capsule Take 1 capsule by mouth 3 (Three) Times a Day.      magnesium oxide (MAG-OX) 400 MG tablet Take 1 tablet by mouth Daily.      multivitamin (THERAGRAN) tablet tablet Take  by mouth Daily.      NON FORMULARY Take 1,000 mg by mouth Daily. Superbeets      Quercetin 500 MG capsule Take 355 mg by mouth Daily.      torsemide (DEMADEX) 100 MG tablet Take 1 tablet by mouth Daily.      traMADol (ULTRAM) 50 MG tablet Take 1 tablet by mouth 3 (Three) Times a Day. for 30 days.      traZODone (DESYREL) 50 MG tablet Take 1 tablet by mouth As Needed.      Turmeric-Ginger 150-25 MG chewable tablet Chew 2,360 mg Daily. Turmeric,  "Bioerine, Garlic, josephine.      vitamin B-12 (CYANOCOBALAMIN) 1000 MCG tablet Take 2.5 tablets by mouth Daily.      vitamin C (ASCORBIC ACID) 250 MG tablet Take 2 tablets by mouth Daily.      vitamin D3 125 MCG (5000 UT) capsule capsule Take 1 capsule by mouth Daily.      Zinc 50 MG tablet Take 1 tablet by mouth Daily.      Metoprolol-hydroCHLOROthiazide (DUTOPROL PO) Take 1 tablet by mouth Daily. (Patient not taking: Reported on 9/1/2023)       No facility-administered medications prior to visit.       Opioid medication/s are on active medication list.  and I have evaluated his active treatment plan and pain score trends (see table).  There were no vitals filed for this visit.  I have reviewed the chart for potential of high risk medication and harmful drug interactions in the elderly.          Aspirin is on active medication list. Aspirin use is indicated based on review of current medical condition/s. Pros and cons of this therapy have been discussed today. Benefits of this medication outweigh potential harm.  Patient has been encouraged to continue taking this medication.  .      Patient Active Problem List   Diagnosis    Chronic gout without tophus    Essential hypertension    Hyperlipidemia    Rheumatoid arthritis    Bilateral lower extremity edema    Shortness of breath    Coronary artery disease involving native coronary artery of native heart without angina pectoris     Advance Care Planning   Advance Care Planning     Advance Directive is not on file.  ACP discussion was declined by the patient. Patient does not have an advance directive, declines further assistance.     Objective    Vitals:    09/01/23 1316   BP: 132/82   Pulse: 101   Resp: 18   Temp: 97.5 øF (36.4 øC)   TempSrc: Temporal   SpO2: 91%   Weight: 124 kg (272 lb 9.6 oz)   Height: 180.3 cm (70.98\")     Estimated body mass index is 38.04 kg/mý as calculated from the following:    Height as of this encounter: 180.3 cm (70.98\").    Weight as of " this encounter: 124 kg (272 lb 9.6 oz).           Does the patient have evidence of cognitive impairment?   Yes: Referral to neurology ordered.    Lab Results   Component Value Date    TRIG 380 (H) 2023    HDL 31 (L) 2023     (H) 2023    VLDL 66 (H) 2023          HEALTH RISK ASSESSMENT    Smoking Status:  Social History     Tobacco Use   Smoking Status Never   Smokeless Tobacco Never     Alcohol Consumption:  Social History     Substance and Sexual Activity   Alcohol Use Not Currently     Fall Risk Screen:    STEADI Fall Risk Assessment was completed, and patient is at MODERATE risk for falls. Assessment completed on:2023    Depression Screenin/1/2023     1:11 PM   PHQ-2/PHQ-9 Depression Screening   Little Interest or Pleasure in Doing Things 0-->not at all   Feeling Down, Depressed or Hopeless 0-->not at all   PHQ-9: Brief Depression Severity Measure Score 0       Health Habits and Functional and Cognitive Screenin/1/2023     1:10 PM   Functional & Cognitive Status   Do you have difficulty preparing food and eating? No   Do you have difficulty bathing yourself, getting dressed or grooming yourself? Yes   Do you have difficulty using the toilet? No   Do you have difficulty moving around from place to place? Yes   Do you have trouble with steps or getting out of a bed or a chair? Yes   Current Diet Well Balanced Diet   Dental Exam Not up to date   Eye Exam Up to date   Exercise (times per week) 0 times per week   Current Exercises Include No Regular Exercise   Do you need help using the phone?  No   Are you deaf or do you have serious difficulty hearing?  Yes   Do you need help to go to places out of walking distance? Yes   Do you need help shopping? No   Do you need help preparing meals?  No   Do you need help with housework?  No   Do you need help with laundry? No   Do you need help taking your medications? No   Do you need help managing money? No   Do you ever  drive or ride in a car without wearing a seat belt? No   Have you felt unusual stress, anger or loneliness in the last month? No   Who do you live with? Spouse   If you need help, do you have trouble finding someone available to you? No   Have you been bothered in the last four weeks by sexual problems? No   Do you have difficulty concentrating, remembering or making decisions? Yes       Age-appropriate Screening Schedule:  Refer to the list below for future screening recommendations based on patient's age, sex and/or medical conditions. Orders for these recommended tests are listed in the plan section. The patient has been provided with a written plan.    Health Maintenance   Topic Date Due    COLORECTAL CANCER SCREENING  04/05/2023    Pneumococcal Vaccine 65+ (1 - PCV) 06/01/2024 (Originally 5/18/2017)    TDAP/TD VACCINES (2 - Td or Tdap) 06/01/2024 (Originally 5/4/2017)    ZOSTER VACCINE (1 of 2) 06/28/2024 (Originally 5/18/2002)    INFLUENZA VACCINE  10/01/2023    BMI FOLLOWUP  06/01/2024    LIPID PANEL  06/07/2024    ANNUAL WELLNESS VISIT  09/01/2024    HEPATITIS C SCREENING  Completed    COVID-19 Vaccine  Discontinued                CMS Preventative Services Quick Reference  Risk Factors Identified During Encounter:    Inactivity/Sedentary: Patient was advised to exercise at least 150 minutes a week per CDC recommendations.  Polypharmacy: Medication List reviewed    The above risks/problems have been discussed with the patient.  Pertinent information has been shared with the patient in the After Visit Summary.    Diagnoses and all orders for this visit:    1. Encounter for subsequent annual wellness visit (AWV) in Medicare patient (Primary)  Comments:  continue current plan of care  Orders:  -     CBC w AUTO Differential; Future  -     Comprehensive metabolic panel; Future  -     Lipid panel; Future  -     TSH; Future  -     Hemoglobin A1c; Future    2. Memory loss  -     Ambulatory Referral to  Neurology      The preventive exam has been reviewed in detail.  The patient has been fully counseled on preventative guidelines for vaccines, cancer screenings, and other health maintenance needs.   The patient has been counseled on guidelines for maintaining a lifestyle to promote good health and to minimize chronic diseases.  The patient has been assisted with scheduling these healthcare procedures for the coming year and given a written document of health maintenance and anticipatory guidance for age with the AVS.     I spent 30 minutes caring for Fer on this date of service. This time includes time spent by me in the following activities:preparing for the visit, reviewing tests, obtaining and/or reviewing a separately obtained history, performing a medically appropriate examination and/or evaluation , counseling and educating the patient/family/caregiver, ordering medications, tests, or procedures, referring and communicating with other health care professionals , documenting information in the medical record, independently interpreting results and communicating that information with the patient/family/caregiver and care coordination     Follow Up:   Next Medicare Wellness visit to be scheduled in 1 year.      An After Visit Summary and PPPS were made available to the patient.        This document has been electronically signed by KELSEY Webb  September 1, 2023 15:39 EDT

## 2023-09-08 ENCOUNTER — LAB (OUTPATIENT)
Dept: FAMILY MEDICINE CLINIC | Facility: CLINIC | Age: 73
End: 2023-09-08
Payer: MEDICARE

## 2023-09-08 DIAGNOSIS — Z00.00 ENCOUNTER FOR SUBSEQUENT ANNUAL WELLNESS VISIT (AWV) IN MEDICARE PATIENT: ICD-10-CM

## 2023-09-08 DIAGNOSIS — E78.5 HYPERLIPIDEMIA, UNSPECIFIED HYPERLIPIDEMIA TYPE: Primary | ICD-10-CM

## 2023-09-08 LAB
ALBUMIN SERPL-MCNC: 4.3 G/DL (ref 3.5–5.2)
ALBUMIN/GLOB SERPL: 1.4 G/DL
ALP SERPL-CCNC: 78 U/L (ref 39–117)
ALT SERPL W P-5'-P-CCNC: 48 U/L (ref 1–41)
ANION GAP SERPL CALCULATED.3IONS-SCNC: 12.3 MMOL/L (ref 5–15)
AST SERPL-CCNC: 27 U/L (ref 1–40)
BASOPHILS # BLD AUTO: 0.04 10*3/MM3 (ref 0–0.2)
BASOPHILS NFR BLD AUTO: 0.6 % (ref 0–1.5)
BILIRUB SERPL-MCNC: 0.6 MG/DL (ref 0–1.2)
BUN SERPL-MCNC: 20 MG/DL (ref 8–23)
BUN/CREAT SERPL: 13.9 (ref 7–25)
CALCIUM SPEC-SCNC: 10.5 MG/DL (ref 8.6–10.5)
CHLORIDE SERPL-SCNC: 95 MMOL/L (ref 98–107)
CHOLEST SERPL-MCNC: 184 MG/DL (ref 0–200)
CO2 SERPL-SCNC: 30.7 MMOL/L (ref 22–29)
CREAT SERPL-MCNC: 1.44 MG/DL (ref 0.76–1.27)
DEPRECATED RDW RBC AUTO: 54.5 FL (ref 37–54)
EGFRCR SERPLBLD CKD-EPI 2021: 51.9 ML/MIN/1.73
EOSINOPHIL # BLD AUTO: 0.26 10*3/MM3 (ref 0–0.4)
EOSINOPHIL NFR BLD AUTO: 3.7 % (ref 0.3–6.2)
ERYTHROCYTE [DISTWIDTH] IN BLOOD BY AUTOMATED COUNT: 13.6 % (ref 12.3–15.4)
GLOBULIN UR ELPH-MCNC: 3 GM/DL
GLUCOSE SERPL-MCNC: 155 MG/DL (ref 65–99)
HBA1C MFR BLD: 6.6 % (ref 4.8–5.6)
HCT VFR BLD AUTO: 41.5 % (ref 37.5–51)
HDLC SERPL-MCNC: 28 MG/DL (ref 40–60)
HGB BLD-MCNC: 14.4 G/DL (ref 13–17.7)
IMM GRANULOCYTES # BLD AUTO: 0.01 10*3/MM3 (ref 0–0.05)
IMM GRANULOCYTES NFR BLD AUTO: 0.1 % (ref 0–0.5)
LDLC SERPL CALC-MCNC: 87 MG/DL (ref 0–100)
LDLC/HDLC SERPL: 2.55 {RATIO}
LYMPHOCYTES # BLD AUTO: 1.87 10*3/MM3 (ref 0.7–3.1)
LYMPHOCYTES NFR BLD AUTO: 26.5 % (ref 19.6–45.3)
MCH RBC QN AUTO: 37.2 PG (ref 26.6–33)
MCHC RBC AUTO-ENTMCNC: 34.7 G/DL (ref 31.5–35.7)
MCV RBC AUTO: 107.2 FL (ref 79–97)
MONOCYTES # BLD AUTO: 0.87 10*3/MM3 (ref 0.1–0.9)
MONOCYTES NFR BLD AUTO: 12.3 % (ref 5–12)
NEUTROPHILS NFR BLD AUTO: 4 10*3/MM3 (ref 1.7–7)
NEUTROPHILS NFR BLD AUTO: 56.8 % (ref 42.7–76)
NRBC BLD AUTO-RTO: 0 /100 WBC (ref 0–0.2)
PLATELET # BLD AUTO: 194 10*3/MM3 (ref 140–450)
PMV BLD AUTO: 10.3 FL (ref 6–12)
POTASSIUM SERPL-SCNC: 3.8 MMOL/L (ref 3.5–5.2)
PROT SERPL-MCNC: 7.3 G/DL (ref 6–8.5)
RBC # BLD AUTO: 3.87 10*6/MM3 (ref 4.14–5.8)
SODIUM SERPL-SCNC: 138 MMOL/L (ref 136–145)
TRIGL SERPL-MCNC: 423 MG/DL (ref 0–150)
TSH SERPL DL<=0.05 MIU/L-ACNC: 1.1 UIU/ML (ref 0.27–4.2)
VLDLC SERPL-MCNC: 69 MG/DL (ref 5–40)
WBC NRBC COR # BLD: 7.05 10*3/MM3 (ref 3.4–10.8)

## 2023-09-08 PROCEDURE — 83036 HEMOGLOBIN GLYCOSYLATED A1C: CPT | Performed by: NURSE PRACTITIONER

## 2023-09-08 PROCEDURE — 85025 COMPLETE CBC W/AUTO DIFF WBC: CPT | Performed by: NURSE PRACTITIONER

## 2023-09-08 PROCEDURE — 80053 COMPREHEN METABOLIC PANEL: CPT | Performed by: NURSE PRACTITIONER

## 2023-09-08 PROCEDURE — 84443 ASSAY THYROID STIM HORMONE: CPT | Performed by: NURSE PRACTITIONER

## 2023-09-08 PROCEDURE — 80061 LIPID PANEL: CPT | Performed by: NURSE PRACTITIONER

## 2023-09-11 RX ORDER — FENOFIBRATE 145 MG/1
145 TABLET, COATED ORAL DAILY
Qty: 90 TABLET | Refills: 0 | Status: SHIPPED | OUTPATIENT
Start: 2023-09-11

## 2023-09-11 NOTE — PROGRESS NOTES
Results discussed with pt's wife. Fenofibrate 145 mg sent into [facility] ($4 list) for hyperlipidemia.

## 2023-09-14 ENCOUNTER — PATIENT MESSAGE (OUTPATIENT)
Dept: FAMILY MEDICINE CLINIC | Facility: CLINIC | Age: 73
End: 2023-09-14
Payer: MEDICARE

## 2023-09-14 DIAGNOSIS — R30.0 DYSURIA: Primary | ICD-10-CM

## 2023-09-14 DIAGNOSIS — Z85.51 HISTORY OF BLADDER CANCER: ICD-10-CM

## 2023-09-14 NOTE — TELEPHONE ENCOUNTER
From: Fer Ch  To: Cielo Nunez  Sent: 9/14/2023 3:05 AM EDT  Subject: Urologist    Adonay for months has had difficulty urinating. He was hiding it but the last month it has gotten so bad. He now sits down to pee and I hear him moaning in pain. I do not see blood in his urine but something is wrong. With him taking Torsemide he goes often but without much success. I think it is past time for an urologist since he has had bladder cancer. Referral?  Thanks

## 2023-09-26 ENCOUNTER — TELEPHONE (OUTPATIENT)
Dept: NEUROLOGY | Facility: CLINIC | Age: 73
End: 2023-09-26

## 2023-09-26 ENCOUNTER — LAB (OUTPATIENT)
Dept: LAB | Facility: HOSPITAL | Age: 73
End: 2023-09-26
Payer: MEDICARE

## 2023-09-26 ENCOUNTER — OFFICE VISIT (OUTPATIENT)
Dept: NEUROLOGY | Facility: CLINIC | Age: 73
End: 2023-09-26
Payer: MEDICARE

## 2023-09-26 VITALS
OXYGEN SATURATION: 97 % | HEART RATE: 88 BPM | SYSTOLIC BLOOD PRESSURE: 128 MMHG | BODY MASS INDEX: 38.11 KG/M2 | HEIGHT: 71 IN | WEIGHT: 272.2 LBS | DIASTOLIC BLOOD PRESSURE: 94 MMHG

## 2023-09-26 DIAGNOSIS — M79.2 NEUROPATHIC PAIN: Primary | ICD-10-CM

## 2023-09-26 DIAGNOSIS — L97.808: ICD-10-CM

## 2023-09-26 DIAGNOSIS — M79.2 NEUROPATHIC PAIN: ICD-10-CM

## 2023-09-26 DIAGNOSIS — R20.2 PARESTHESIA: ICD-10-CM

## 2023-09-26 DIAGNOSIS — I87.2: ICD-10-CM

## 2023-09-26 DIAGNOSIS — E11.9 TYPE 2 DIABETES MELLITUS WITHOUT COMPLICATION, WITHOUT LONG-TERM CURRENT USE OF INSULIN: ICD-10-CM

## 2023-09-26 PROBLEM — Z86.39 PERSONAL HISTORY OF OTHER ENDOCRINE, NUTRITIONAL AND METABOLIC DISEASE: Status: ACTIVE | Noted: 2022-11-22

## 2023-09-26 PROBLEM — M51.369 DEGENERATIVE DISC DISEASE, LUMBAR: Status: ACTIVE | Noted: 2018-11-29

## 2023-09-26 PROBLEM — M10.9 GOUTY ARTHROPATHY: Status: ACTIVE | Noted: 2023-09-06

## 2023-09-26 PROBLEM — R52 GENERALIZED PAIN: Status: ACTIVE | Noted: 2023-09-05

## 2023-09-26 PROBLEM — C67.2 MALIGNANT NEOPLASM OF LATERAL WALL OF URINARY BLADDER: Status: ACTIVE | Noted: 2022-04-06

## 2023-09-26 PROBLEM — E66.9 OBESITY: Status: ACTIVE | Noted: 2020-01-09

## 2023-09-26 PROBLEM — N13.8 ENLARGED PROSTATE WITH URINARY OBSTRUCTION: Status: ACTIVE | Noted: 2022-04-06

## 2023-09-26 PROBLEM — E66.812 CLASS 2 SEVERE OBESITY DUE TO EXCESS CALORIES WITH SERIOUS COMORBIDITY AND BODY MASS INDEX (BMI) OF 39.0 TO 39.9 IN ADULT: Status: ACTIVE | Noted: 2022-11-22

## 2023-09-26 PROBLEM — G47.30 SLEEP APNEA: Status: ACTIVE | Noted: 2022-04-15

## 2023-09-26 PROBLEM — N40.1 ENLARGED PROSTATE WITH URINARY OBSTRUCTION: Status: ACTIVE | Noted: 2022-04-06

## 2023-09-26 PROBLEM — C67.9 UROTHELIAL CARCINOMA OF BLADDER WITHOUT INVASION OF MUSCLE: Status: ACTIVE | Noted: 2022-08-25

## 2023-09-26 PROBLEM — M54.9 CHRONIC BACK PAIN: Status: ACTIVE | Noted: 2022-04-15

## 2023-09-26 PROBLEM — E66.01 CLASS 2 SEVERE OBESITY DUE TO EXCESS CALORIES WITH SERIOUS COMORBIDITY AND BODY MASS INDEX (BMI) OF 39.0 TO 39.9 IN ADULT: Status: ACTIVE | Noted: 2022-11-22

## 2023-09-26 PROBLEM — I25.10 CORONARY ARTERY DISEASE INVOLVING NATIVE CORONARY ARTERY OF NATIVE HEART WITHOUT ANGINA PECTORIS: Status: ACTIVE | Noted: 2021-01-13

## 2023-09-26 PROBLEM — R31.0 GROSS HEMATURIA: Status: ACTIVE | Noted: 2022-04-06

## 2023-09-26 PROBLEM — G62.9 NEUROPATHY: Status: ACTIVE | Noted: 2022-04-15

## 2023-09-26 PROBLEM — M1A.09X0 IDIOPATHIC CHRONIC GOUT OF MULTIPLE SITES WITHOUT TOPHUS: Status: ACTIVE | Noted: 2020-06-15

## 2023-09-26 PROBLEM — M51.36 DEGENERATIVE DISC DISEASE, LUMBAR: Status: ACTIVE | Noted: 2018-11-29

## 2023-09-26 PROBLEM — M25.50 POLYARTHRALGIA: Status: ACTIVE | Noted: 2020-01-09

## 2023-09-26 PROBLEM — R06.83 SNORING: Status: ACTIVE | Noted: 2017-02-15

## 2023-09-26 PROBLEM — Z79.899 ENCOUNTER FOR LONG-TERM (CURRENT) USE OF MEDICATIONS: Status: ACTIVE | Noted: 2021-05-27

## 2023-09-26 PROBLEM — I45.10 RBBB: Status: ACTIVE | Noted: 2017-02-15

## 2023-09-26 PROBLEM — R60.9 EDEMA: Status: ACTIVE | Noted: 2017-09-19

## 2023-09-26 PROBLEM — I89.0 LYMPHEDEMA: Status: ACTIVE | Noted: 2021-01-13

## 2023-09-26 PROBLEM — N18.30 STAGE 3 CHRONIC KIDNEY DISEASE: Status: ACTIVE | Noted: 2017-09-19

## 2023-09-26 PROBLEM — G89.29 CHRONIC BACK PAIN: Status: ACTIVE | Noted: 2022-04-15

## 2023-09-26 PROBLEM — I50.30 DIASTOLIC HEART FAILURE: Chronic | Status: ACTIVE | Noted: 2017-03-01

## 2023-09-26 PROBLEM — F32.A DEPRESSION: Status: ACTIVE | Noted: 2022-04-15

## 2023-09-26 PROBLEM — I38 VALVULAR HEART DISEASE: Status: ACTIVE | Noted: 2021-04-21

## 2023-09-26 PROBLEM — R77.8 ABNORMAL SPEP: Status: ACTIVE | Noted: 2020-06-15

## 2023-09-26 PROBLEM — R25.2 LEG CRAMPING: Status: ACTIVE | Noted: 2022-11-22

## 2023-09-26 PROBLEM — Z00.00 HEALTHCARE MAINTENANCE: Status: ACTIVE | Noted: 2022-11-22

## 2023-09-26 PROBLEM — Z87.891 FORMER SMOKER: Status: ACTIVE | Noted: 2022-11-22

## 2023-09-26 PROBLEM — I12.9 HYPERTENSIVE CHRONIC KIDNEY DISEASE WITH STAGE 1 THROUGH STAGE 4 CHRONIC KIDNEY DISEASE, OR UNSPECIFIED CHRONIC KIDNEY DISEASE: Status: ACTIVE | Noted: 2017-09-19

## 2023-09-26 PROBLEM — G60.9 IDIOPATHIC PERIPHERAL NEUROPATHY: Status: ACTIVE | Noted: 2023-09-06

## 2023-09-26 PROBLEM — M25.569 KNEE PAIN: Status: ACTIVE | Noted: 2019-04-17

## 2023-09-26 PROCEDURE — 82300 ASSAY OF CADMIUM: CPT

## 2023-09-26 PROCEDURE — 86140 C-REACTIVE PROTEIN: CPT

## 2023-09-26 PROCEDURE — 83655 ASSAY OF LEAD: CPT

## 2023-09-26 PROCEDURE — 82607 VITAMIN B-12: CPT

## 2023-09-26 PROCEDURE — 36415 COLL VENOUS BLD VENIPUNCTURE: CPT

## 2023-09-26 PROCEDURE — 82175 ASSAY OF ARSENIC: CPT

## 2023-09-26 PROCEDURE — 83036 HEMOGLOBIN GLYCOSYLATED A1C: CPT

## 2023-09-26 PROCEDURE — 86200 CCP ANTIBODY: CPT

## 2023-09-26 PROCEDURE — 3074F SYST BP LT 130 MM HG: CPT | Performed by: NURSE PRACTITIONER

## 2023-09-26 PROCEDURE — 3080F DIAST BP >= 90 MM HG: CPT | Performed by: NURSE PRACTITIONER

## 2023-09-26 PROCEDURE — 84165 PROTEIN E-PHORESIS SERUM: CPT

## 2023-09-26 PROCEDURE — 85025 COMPLETE CBC W/AUTO DIFF WBC: CPT

## 2023-09-26 PROCEDURE — 82746 ASSAY OF FOLIC ACID SERUM: CPT

## 2023-09-26 PROCEDURE — 80053 COMPREHEN METABOLIC PANEL: CPT

## 2023-09-26 PROCEDURE — 86617 LYME DISEASE ANTIBODY: CPT

## 2023-09-26 PROCEDURE — 82595 ASSAY OF CRYOGLOBULIN: CPT

## 2023-09-26 PROCEDURE — 83825 ASSAY OF MERCURY: CPT

## 2023-09-26 PROCEDURE — 86038 ANTINUCLEAR ANTIBODIES: CPT

## 2023-09-26 PROCEDURE — 86334 IMMUNOFIX E-PHORESIS SERUM: CPT

## 2023-09-26 PROCEDURE — 99214 OFFICE O/P EST MOD 30 MIN: CPT | Performed by: NURSE PRACTITIONER

## 2023-09-26 PROCEDURE — 82570 ASSAY OF URINE CREATININE: CPT

## 2023-09-26 PROCEDURE — 84443 ASSAY THYROID STIM HORMONE: CPT

## 2023-09-26 PROCEDURE — 82784 ASSAY IGA/IGD/IGG/IGM EACH: CPT

## 2023-09-26 PROCEDURE — 86431 RHEUMATOID FACTOR QUANT: CPT

## 2023-09-26 RX ORDER — DULOXETIN HYDROCHLORIDE 60 MG/1
60 CAPSULE, DELAYED RELEASE ORAL DAILY
Qty: 30 CAPSULE | Refills: 2 | Status: SHIPPED | OUTPATIENT
Start: 2023-09-26 | End: 2024-09-25

## 2023-09-26 RX ORDER — HYDROCODONE BITARTRATE AND ACETAMINOPHEN 5; 325 MG/1; MG/1
1 TABLET ORAL EVERY 8 HOURS PRN
COMMUNITY

## 2023-09-26 NOTE — LETTER
2023     KELSEY Webb  [address]  [facility] [address]    Patient: Fer Ch   YOB: 1950   Date of Visit: 2023     Dear KELSEY Webb:       Thank you for referring Fer Ch to me for evaluation. Below are the relevant portions of my assessment and plan of care.    If you have questions, please do not hesitate to call me. I look forward to following Fer along with you.         Sincerely,        Sujatha Diehl DNP, APRN        CC: No Recipients    Sujatha Diehl DNP, APRN  23 [id number]  Signed     Neuro Office Visit      Encounter Date: 2023   Patient Name: Fer Ch  : 1950   MRN: [id number]   PCP: KELSEY Frias  Chief Complaint:    Chief Complaint   Patient presents with   • Memory Loss   • Numbness       History of Present Illness: Fer Ch is a 73 y.o. male who is here today in Neurology w his wife for  numbness and memory loss.    Numbness  Pt with a 10 year history of numbness and tingling in bilat hands and feet up to calf.  Has been diagnosed with idiopathic neuropathy after exam with a tuning fork. States he has not had a work up for the cause of his neuropathy.  Treated with GBP and Ultram. Now going to pain management. GBP has been discontinued and he is taking Ultram and Norco. He feels none these treatments have been beneficial.  Has a history of low back pain, RA, gout.  He does drink etoh with only 2 glasses of wine and 2 margaritas a week.  Denies tick bites, injuries, exposures to chemo, radiation or other toxins. History of bladder cancer. Just recently diagnosed with DM w A1c 6.6. Chart has listed abnormal SPEP.  No family history of neuropathy.  He has severe bilat dependent edema with venous stasis ulcers.  Does not wear compression hose. Ambulates with cane. Also using a power chair.     He is a retired .     He is quite upset with the care he has received up until today.          Memory Loss  Pt and his wife were unaware that a referral was given for memory loss. They both feel his memory loss is typical for aging.  MMSE performed today 24/30 w poor effort as he was irritated with the questions.      Labs: tsh-nml  Egfr51,  RBC low, H/H-stable, A1c 6.6  MRI Brain Without Contrast (07/23/2015 12:46)-no result available    PMH:htn, hld, RA, CAD, chronic pain managed by pain management, idiopathic neuropathy, depression, opiod use, RA  FH:-neuropathy  SH:-tob, -etoh  Subjective      Past Medical History:   Past Medical History:   Diagnosis Date   • Bladder cancer     Finished tx in 12/2022   • CAD (coronary artery disease)    • Diastolic congestive heart failure    • Dyspnea    • Hypertension    • Knee pain    • Lymphedema        Past Surgical History:   Past Surgical History:   Procedure Laterality Date   • CARDIAC CATHETERIZATION     • HERNIA REPAIR         Family History:   Family History   Problem Relation Age of Onset   • Cancer Father        Social History:   Social History     Socioeconomic History   • Marital status:    Tobacco Use   • Smoking status: Never   • Smokeless tobacco: Never   Substance and Sexual Activity   • Alcohol use: Not Currently   • Drug use: Never   • Sexual activity: Defer       Medications:     Current Outpatient Medications:   •  allopurinol (ZYLOPRIM) 100 MG tablet, Take 1 tablet by mouth Daily., Disp: 90 tablet, Rfl: 0  •  allopurinol (ZYLOPRIM) 300 MG tablet, Take 1 tablet by mouth Daily., Disp: , Rfl:   •  ASPIRIN 81 PO, Take 1 tablet by mouth Daily., Disp: , Rfl:   •  carvedilol (COREG) 6.25 MG tablet, Take 1 tablet by mouth 2 (Two) Times a Day With Meals., Disp: , Rfl:   •  fenofibrate (Tricor) 145 MG tablet, Take 1 tablet by mouth Daily., Disp: 90 tablet, Rfl: 0  •  HYDROcodone-acetaminophen (NORCO) 5-325 MG per tablet, Take 1 tablet by mouth Every 8 (Eight) Hours As Needed., Disp: , Rfl:   •  magnesium oxide (MAG-OX) 400 MG tablet, Take 1  "tablet by mouth Daily., Disp: , Rfl:   •  multivitamin (THERAGRAN) tablet tablet, Take  by mouth Daily., Disp: , Rfl:   •  NON FORMULARY, Take 1,000 mg by mouth Daily. Superbeets, Disp: , Rfl:   •  Quercetin 500 MG capsule, Take 355 mg by mouth Daily., Disp: , Rfl:   •  torsemide (DEMADEX) 100 MG tablet, Take 1 tablet by mouth Daily., Disp: , Rfl:   •  Turmeric-Larissa 150-25 MG chewable tablet, Chew 2,360 mg Daily. Turmeric, Bioerine, Garlic, larissa., Disp: , Rfl:   •  vitamin B-12 (CYANOCOBALAMIN) 1000 MCG tablet, Take 2.5 tablets by mouth Daily., Disp: , Rfl:   •  vitamin C (ASCORBIC ACID) 250 MG tablet, Take 2 tablets by mouth Daily., Disp: , Rfl:   •  vitamin D3 125 MCG (5000 UT) capsule capsule, Take 1 capsule by mouth Daily., Disp: , Rfl:   •  Zinc 50 MG tablet, Take 1 tablet by mouth Daily., Disp: , Rfl:   •  DULoxetine (Cymbalta) 60 MG capsule, Take 1 capsule by mouth Daily., Disp: 30 capsule, Rfl: 2    Allergies:   Allergies   Allergen Reactions   • Atorvastatin Myalgia   • Potassium-Containing Compounds Other (See Comments)     \"cramps\"       PHQ-9 Total Score:     STEADI Fall Risk Assessment was completed, and patient is at HIGH risk for falls. Assessment completed on:9/26/2023    Objective     Physical Exam:   Physical Exam  Neurological:      Mental Status: He is oriented to person, place, and time.      Deep Tendon Reflexes:      Reflex Scores:       Tricep reflexes are 2+ on the right side and 2+ on the left side.       Bicep reflexes are 2+ on the right side and 2+ on the left side.       Brachioradialis reflexes are 2+ on the right side and 2+ on the left side.       Patellar reflexes are 1+ on the right side and 1+ on the left side.       Achilles reflexes are 0 on the right side and 0 on the left side.  Psychiatric:         Speech: Speech normal.       Neurologic Exam     Mental Status   Oriented to person, place, and time.   Disoriented to person.   Oriented to place.   Oriented to time. "   Registration: recalls 3 of 3 objects. Recall at 5 minutes: recalls 2 of 3 objects. Follows 3 step commands.   Attention: normal. Concentration: normal.   Speech: speech is normal   Level of consciousness: alert  Knowledge: consistent with education. Unable to perform simple calculations.   Able to name object. Able to read. Able to repeat. Unable to write. Normal comprehension.   MMSE 24/30.       Cranial Nerves     CN III, IV, VI   Right pupil: Accommodation: intact.   Left pupil: Accommodation: intact.   CN III: no CN III palsy  CN VI: no CN VI palsy  Nystagmus: none   Diplopia: none  Upgaze: normal  Downgaze: normal  Conjugate gaze: present    CN VII   Facial expression full, symmetric.     CN VIII   Hearing: intact    Motor Exam   Muscle bulk: normal  Overall muscle tone: normal    Strength   Right deltoid: 4/5  Left deltoid: 4/5  Right biceps: 4/5  Left biceps: 4/5  Right triceps: 4/5  Left triceps: 4/5  Right interossei: 4/5  Left interossei: 4/5  Right quadriceps: 4/5  Left quadriceps: 4/5  Right anterior tibial: 4/5  Left anterior tibial: 4/5  Right posterior tibial: 4/5  Left posterior tibial: 4/5    Sensory Exam   Right arm light touch: normal  Left arm light touch: normal  Right leg light touch: decreased from toes  Left leg light touch: decreased from toes  Right arm vibration: normal  Left arm vibration: normal  Right leg vibration: decreased from toes  Left leg vibration: decreased from toes  Neg phalen's. Positive Tinnel on left     Gait, Coordination, and Reflexes     Tremor   Resting tremor: absent  Action tremor: absent    Reflexes   Right brachioradialis: 2+  Left brachioradialis: 2+  Right biceps: 2+  Left biceps: 2+  Right triceps: 2+  Left triceps: 2+  Right patellar: 1+  Left patellar: 1+  Right achilles: 0  Left achilles: 0  Right : 2+  Left : 2+  Right plantar: equivocal  Left plantar: normal     Vital Signs:   Vitals:    09/26/23 1343   BP: 128/94   Pulse: 88   SpO2: 97%   Weight:  "123 kg (272 lb 3.2 oz)   Height: 180.3 cm (70.98\")     Body mass index is 37.98 kg/m².       Assessment / Plan      Assessment/Plan:   Diagnoses and all orders for this visit:    1. Neuropathic pain (Primary)  -     EMG & Nerve Conduction Test; Future  -     MRI Cervical Spine With & Without Contrast; Future  -     MRI Lumbar Spine Without Contrast; Future  -     CBC & Differential; Future  -     Comprehensive Metabolic Panel; Future  -     C-reactive Protein; Future  -     Cryoglobulin; Future  -     Heavy Metals Profile II, Urine - Urine, Clean Catch; Future  -     Hemoglobin A1c; Future  -     MARYLOU + PE; Future  -     Lyme Disease, Line Blot; Future  -     Rheumatoid Arthritis (RA) Profile; Future  -     TSH; Future  -     Vitamin B12 & Folate; Future  -     LASHELL by IFA, Reflex 9-biomarkers profile; Future  -     DULoxetine (Cymbalta) 60 MG capsule; Take 1 capsule by mouth Daily.  Dispense: 30 capsule; Refill: 2    2. Paresthesia  -     EMG & Nerve Conduction Test; Future  -     MRI Cervical Spine With & Without Contrast; Future  -     MRI Lumbar Spine Without Contrast; Future  -     CBC & Differential; Future  -     Comprehensive Metabolic Panel; Future  -     C-reactive Protein; Future  -     Cryoglobulin; Future  -     Heavy Metals Profile II, Urine - Urine, Clean Catch; Future  -     Hemoglobin A1c; Future  -     MARYLOU + PE; Future  -     Lyme Disease, Line Blot; Future  -     Rheumatoid Arthritis (RA) Profile; Future  -     TSH; Future  -     Vitamin B12 & Folate; Future  -     LASHELL by IFA, Reflex 9-biomarkers profile; Future    3. Type 2 diabetes mellitus without complication, without long-term current use of insulin  -     Hemoglobin A1c; Future    4. Venous stasis ulcer of other part of lower leg with other ulcer severity without varicose veins, unspecified laterality  -     Ambulatory Referral to Wound Clinic     Pt has a CSC wit pain management. I would like to start him back on GBP but will need approval of " "pain management. Will start duloxetine and anticipate starting back on GBP.    Pt and his wife argued in loud voices during most of the interview. Pt verbalized his dissatisfaction with previous providers who were \"quacks\". Pt verbalized that no one has taken the time to try to find the cause of his symptoms.   He was argumentative and disruptive during the interview with frequent cursing. Multiple times the pt threatened to leave the visit but his wife begged him to stay.   I informed the patient that I was trying to help him but he must be cooperative and respectful if he wished to stay and he wanted to leave he could. He chose to stay and was more cooperative.    Patient Education:     Reviewed medications, potential side effects and signs and symptoms to report. Discussed risk versus benefits of treatment plan with patient and/or family-including medications, labs and radiology that may be ordered. Addressed questions and concerns during visit. Patient and/or family verbalized understanding and agree with plan. Instructed to call the office with any questions and report to ER with any life-threatening symptoms.     Follow Up:   Return in about 4 months (around 1/26/2024) for Recheck.    During this visit the following were done:  Labs Reviewed [x]    Labs Ordered [x]    Radiology Reports Reviewed []    Radiology Ordered []    PCP Records Reviewed [x]    Referring Provider Records Reviewed []    ER Records Reviewed []    Hospital Records Reviewed []    History Obtained From Family [x]    Radiology Images Reviewed []    Other Reviewed [x]    Records Requested []      Sujatha Diehl, DNP, APRN    "

## 2023-09-26 NOTE — PROGRESS NOTES
Neuro Office Visit      Encounter Date: 2023   Patient Name: Fer Ch  : 1950   MRN: [id number]   PCP: KELSEY Frias  Chief Complaint:    Chief Complaint   Patient presents with    Memory Loss    Numbness       History of Present Illness: Fer Ch is a 73 y.o. male who is here today in Neurology w his wife for  numbness and memory loss.    Numbness  Pt with a 10 year history of numbness and tingling in bilat hands and feet up to calf.  Has been diagnosed with idiopathic neuropathy after exam with a tuning fork. States he has not had a work up for the cause of his neuropathy.  Treated with GBP and Ultram. Now going to pain management. GBP has been discontinued and he is taking Ultram and Norco. He feels none these treatments have been beneficial.  Has a history of low back pain, RA, gout.  He does drink etoh with only 2 glasses of wine and 2 margaritas a week.  Denies tick bites, injuries, exposures to chemo, radiation or other toxins. History of bladder cancer. Just recently diagnosed with DM w A1c 6.6. Chart has listed abnormal SPEP.  No family history of neuropathy.  He has severe bilat dependent edema with venous stasis ulcers.  Does not wear compression hose. Ambulates with cane. Also using a power chair.     He is a retired .     He is quite upset with the care he has received up until today.         Memory Loss  Pt and his wife were unaware that a referral was given for memory loss. They both feel his memory loss is typical for aging.  MMSE performed today 24/30 w poor effort as he was irritated with the questions.      Labs: tsh-nml  Egfr51,  RBC low, H/H-stable, A1c 6.6  MRI Brain Without Contrast (2015 12:46)-no result available    PMH:htn, hld, RA, CAD, chronic pain managed by pain management, idiopathic neuropathy, depression, opiod use, RA  FH:-neuropathy  SH:-tob, -etoh  Subjective      Past Medical History:   Past Medical History:   Diagnosis Date     Bladder cancer     Finished tx in 12/2022    CAD (coronary artery disease)     Diastolic congestive heart failure     Dyspnea     Hypertension     Knee pain     Lymphedema        Past Surgical History:   Past Surgical History:   Procedure Laterality Date    CARDIAC CATHETERIZATION      HERNIA REPAIR         Family History:   Family History   Problem Relation Age of Onset    Cancer Father        Social History:   Social History     Socioeconomic History    Marital status:    Tobacco Use    Smoking status: Never    Smokeless tobacco: Never   Substance and Sexual Activity    Alcohol use: Not Currently    Drug use: Never    Sexual activity: Defer       Medications:     Current Outpatient Medications:     allopurinol (ZYLOPRIM) 100 MG tablet, Take 1 tablet by mouth Daily., Disp: 90 tablet, Rfl: 0    allopurinol (ZYLOPRIM) 300 MG tablet, Take 1 tablet by mouth Daily., Disp: , Rfl:     ASPIRIN 81 PO, Take 1 tablet by mouth Daily., Disp: , Rfl:     carvedilol (COREG) 6.25 MG tablet, Take 1 tablet by mouth 2 (Two) Times a Day With Meals., Disp: , Rfl:     fenofibrate (Tricor) 145 MG tablet, Take 1 tablet by mouth Daily., Disp: 90 tablet, Rfl: 0    HYDROcodone-acetaminophen (NORCO) 5-325 MG per tablet, Take 1 tablet by mouth Every 8 (Eight) Hours As Needed., Disp: , Rfl:     magnesium oxide (MAG-OX) 400 MG tablet, Take 1 tablet by mouth Daily., Disp: , Rfl:     multivitamin (THERAGRAN) tablet tablet, Take  by mouth Daily., Disp: , Rfl:     NON FORMULARY, Take 1,000 mg by mouth Daily. Superbeets, Disp: , Rfl:     Quercetin 500 MG capsule, Take 355 mg by mouth Daily., Disp: , Rfl:     torsemide (DEMADEX) 100 MG tablet, Take 1 tablet by mouth Daily., Disp: , Rfl:     Turmeric-Ginger 150-25 MG chewable tablet, Chew 2,360 mg Daily. Turmeric, Bioerine, Garlic, ginger., Disp: , Rfl:     vitamin B-12 (CYANOCOBALAMIN) 1000 MCG tablet, Take 2.5 tablets by mouth Daily., Disp: , Rfl:     vitamin C (ASCORBIC ACID) 250 MG tablet,  "Take 2 tablets by mouth Daily., Disp: , Rfl:     vitamin D3 125 MCG (5000 UT) capsule capsule, Take 1 capsule by mouth Daily., Disp: , Rfl:     Zinc 50 MG tablet, Take 1 tablet by mouth Daily., Disp: , Rfl:     DULoxetine (Cymbalta) 60 MG capsule, Take 1 capsule by mouth Daily., Disp: 30 capsule, Rfl: 2    Allergies:   Allergies   Allergen Reactions    Atorvastatin Myalgia    Potassium-Containing Compounds Other (See Comments)     \"cramps\"       PHQ-9 Total Score:     STEADI Fall Risk Assessment was completed, and patient is at HIGH risk for falls. Assessment completed on:9/26/2023    Objective     Physical Exam:   Physical Exam  Neurological:      Mental Status: He is oriented to person, place, and time.      Deep Tendon Reflexes:      Reflex Scores:       Tricep reflexes are 2+ on the right side and 2+ on the left side.       Bicep reflexes are 2+ on the right side and 2+ on the left side.       Brachioradialis reflexes are 2+ on the right side and 2+ on the left side.       Patellar reflexes are 1+ on the right side and 1+ on the left side.       Achilles reflexes are 0 on the right side and 0 on the left side.  Psychiatric:         Speech: Speech normal.       Neurologic Exam     Mental Status   Oriented to person, place, and time.   Disoriented to person.   Oriented to place.   Oriented to time.   Registration: recalls 3 of 3 objects. Recall at 5 minutes: recalls 2 of 3 objects. Follows 3 step commands.   Attention: normal. Concentration: normal.   Speech: speech is normal   Level of consciousness: alert  Knowledge: consistent with education. Unable to perform simple calculations.   Able to name object. Able to read. Able to repeat. Unable to write. Normal comprehension.   MMSE 24/30.       Cranial Nerves     CN III, IV, VI   Right pupil: Accommodation: intact.   Left pupil: Accommodation: intact.   CN III: no CN III palsy  CN VI: no CN VI palsy  Nystagmus: none   Diplopia: none  Upgaze: normal  Downgaze: " "normal  Conjugate gaze: present    CN VII   Facial expression full, symmetric.     CN VIII   Hearing: intact    Motor Exam   Muscle bulk: normal  Overall muscle tone: normal    Strength   Right deltoid: 4/5  Left deltoid: 4/5  Right biceps: 4/5  Left biceps: 4/5  Right triceps: 4/5  Left triceps: 4/5  Right interossei: 4/5  Left interossei: 4/5  Right quadriceps: 4/5  Left quadriceps: 4/5  Right anterior tibial: 4/5  Left anterior tibial: 4/5  Right posterior tibial: 4/5  Left posterior tibial: 4/5    Sensory Exam   Right arm light touch: normal  Left arm light touch: normal  Right leg light touch: decreased from toes  Left leg light touch: decreased from toes  Right arm vibration: normal  Left arm vibration: normal  Right leg vibration: decreased from toes  Left leg vibration: decreased from toes  Neg phalen's. Positive Tinnel on left     Gait, Coordination, and Reflexes     Tremor   Resting tremor: absent  Action tremor: absent    Reflexes   Right brachioradialis: 2+  Left brachioradialis: 2+  Right biceps: 2+  Left biceps: 2+  Right triceps: 2+  Left triceps: 2+  Right patellar: 1+  Left patellar: 1+  Right achilles: 0  Left achilles: 0  Right : 2+  Left : 2+  Right plantar: equivocal  Left plantar: normal     Vital Signs:   Vitals:    09/26/23 1343   BP: 128/94   Pulse: 88   SpO2: 97%   Weight: 123 kg (272 lb 3.2 oz)   Height: 180.3 cm (70.98\")     Body mass index is 37.98 kg/m².       Assessment / Plan      Assessment/Plan:   Diagnoses and all orders for this visit:    1. Neuropathic pain (Primary)  -     EMG & Nerve Conduction Test; Future  -     MRI Cervical Spine With & Without Contrast; Future  -     MRI Lumbar Spine Without Contrast; Future  -     CBC & Differential; Future  -     Comprehensive Metabolic Panel; Future  -     C-reactive Protein; Future  -     Cryoglobulin; Future  -     Heavy Metals Profile II, Urine - Urine, Clean Catch; Future  -     Hemoglobin A1c; Future  -     MARYLOU + PE; " "Future  -     Lyme Disease, Line Blot; Future  -     Rheumatoid Arthritis (RA) Profile; Future  -     TSH; Future  -     Vitamin B12 & Folate; Future  -     LASHELL by IFA, Reflex 9-biomarkers profile; Future  -     DULoxetine (Cymbalta) 60 MG capsule; Take 1 capsule by mouth Daily.  Dispense: 30 capsule; Refill: 2    2. Paresthesia  -     EMG & Nerve Conduction Test; Future  -     MRI Cervical Spine With & Without Contrast; Future  -     MRI Lumbar Spine Without Contrast; Future  -     CBC & Differential; Future  -     Comprehensive Metabolic Panel; Future  -     C-reactive Protein; Future  -     Cryoglobulin; Future  -     Heavy Metals Profile II, Urine - Urine, Clean Catch; Future  -     Hemoglobin A1c; Future  -     MARYLOU + PE; Future  -     Lyme Disease, Line Blot; Future  -     Rheumatoid Arthritis (RA) Profile; Future  -     TSH; Future  -     Vitamin B12 & Folate; Future  -     LASHELL by IFA, Reflex 9-biomarkers profile; Future    3. Type 2 diabetes mellitus without complication, without long-term current use of insulin  -     Hemoglobin A1c; Future    4. Venous stasis ulcer of other part of lower leg with other ulcer severity without varicose veins, unspecified laterality  -     Ambulatory Referral to Wound Clinic     Pt has a CSC wit pain management. I would like to start him back on GBP but will need approval of pain management. Will start duloxetine and anticipate starting back on GBP.    Pt and his wife argued in loud voices during most of the interview. Pt verbalized his dissatisfaction with previous providers who were \"quacks\". Pt verbalized that no one has taken the time to try to find the cause of his symptoms.   He was argumentative and disruptive during the interview with frequent cursing. Multiple times the pt threatened to leave the visit but his wife begged him to stay.   I informed the patient that I was trying to help him but he must be cooperative and respectful if he wished to stay and he wanted to " leave he could. He chose to stay and was more cooperative.    Patient Education:     Reviewed medications, potential side effects and signs and symptoms to report. Discussed risk versus benefits of treatment plan with patient and/or family-including medications, labs and radiology that may be ordered. Addressed questions and concerns during visit. Patient and/or family verbalized understanding and agree with plan. Instructed to call the office with any questions and report to ER with any life-threatening symptoms.     Follow Up:   Return in about 4 months (around 1/26/2024) for Recheck.    During this visit the following were done:  Labs Reviewed [x]    Labs Ordered [x]    Radiology Reports Reviewed []    Radiology Ordered []    PCP Records Reviewed [x]    Referring Provider Records Reviewed []    ER Records Reviewed []    Hospital Records Reviewed []    History Obtained From Family [x]    Radiology Images Reviewed []    Other Reviewed [x]    Records Requested []      Sujatha Diehl, ALEAH, APRN

## 2023-09-26 NOTE — TELEPHONE ENCOUNTER
PER PATIENT SPOUSE, SHE WAS TO CALL PATIENTS PAIN MANAGEMENT REGARDING GABAPENTIN.    PAIN MANAGEMENT NEEDS SOMETHING IN WRITING FROM PROVIDER REGARDING MEDICATION - DOSE, QTY, INSTRUCTIONS AND THEY WILL PRESCRIBE THE MEDICATION.    THEIR FAX NUMBER IS: [phone]    THANK YOU

## 2023-09-27 LAB
ALBUMIN SERPL-MCNC: 4.9 G/DL (ref 3.5–5.2)
ALBUMIN/GLOB SERPL: 1.7 G/DL
ALP SERPL-CCNC: 63 U/L (ref 39–117)
ALT SERPL W P-5'-P-CCNC: 30 U/L (ref 1–41)
ANION GAP SERPL CALCULATED.3IONS-SCNC: 12.6 MMOL/L (ref 5–15)
AST SERPL-CCNC: 25 U/L (ref 1–40)
BASOPHILS # BLD AUTO: 0.03 10*3/MM3 (ref 0–0.2)
BASOPHILS NFR BLD AUTO: 0.4 % (ref 0–1.5)
BILIRUB SERPL-MCNC: 0.5 MG/DL (ref 0–1.2)
BUN SERPL-MCNC: 23 MG/DL (ref 8–23)
BUN/CREAT SERPL: 15.4 (ref 7–25)
CALCIUM SPEC-SCNC: 10.5 MG/DL (ref 8.6–10.5)
CHLORIDE SERPL-SCNC: 98 MMOL/L (ref 98–107)
CO2 SERPL-SCNC: 28.4 MMOL/L (ref 22–29)
CREAT SERPL-MCNC: 1.49 MG/DL (ref 0.76–1.27)
CRP SERPL-MCNC: 1.13 MG/DL (ref 0–0.5)
DEPRECATED RDW RBC AUTO: 51.1 FL (ref 37–54)
EGFRCR SERPLBLD CKD-EPI 2021: 49.9 ML/MIN/1.73
EOSINOPHIL # BLD AUTO: 0.21 10*3/MM3 (ref 0–0.4)
EOSINOPHIL NFR BLD AUTO: 2.5 % (ref 0.3–6.2)
ERYTHROCYTE [DISTWIDTH] IN BLOOD BY AUTOMATED COUNT: 13.4 % (ref 12.3–15.4)
FOLATE SERPL-MCNC: >20 NG/ML (ref 4.78–24.2)
GLOBULIN UR ELPH-MCNC: 2.9 GM/DL
GLUCOSE SERPL-MCNC: 120 MG/DL (ref 65–99)
HBA1C MFR BLD: 6.8 % (ref 4.8–5.6)
HCT VFR BLD AUTO: 41 % (ref 37.5–51)
HGB BLD-MCNC: 14.5 G/DL (ref 13–17.7)
LYMPHOCYTES # BLD AUTO: 1.78 10*3/MM3 (ref 0.7–3.1)
LYMPHOCYTES NFR BLD AUTO: 21.1 % (ref 19.6–45.3)
MCH RBC QN AUTO: 37.3 PG (ref 26.6–33)
MCHC RBC AUTO-ENTMCNC: 35.4 G/DL (ref 31.5–35.7)
MCV RBC AUTO: 105.4 FL (ref 79–97)
MONOCYTES # BLD AUTO: 0.79 10*3/MM3 (ref 0.1–0.9)
MONOCYTES NFR BLD AUTO: 9.4 % (ref 5–12)
NEUTROPHILS NFR BLD AUTO: 5.57 10*3/MM3 (ref 1.7–7)
NEUTROPHILS NFR BLD AUTO: 66.1 % (ref 42.7–76)
PLATELET # BLD AUTO: 199 10*3/MM3 (ref 140–450)
PMV BLD AUTO: 10.3 FL (ref 6–12)
POTASSIUM SERPL-SCNC: 4.2 MMOL/L (ref 3.5–5.2)
PROT SERPL-MCNC: 7.8 G/DL (ref 6–8.5)
RBC # BLD AUTO: 3.89 10*6/MM3 (ref 4.14–5.8)
SODIUM SERPL-SCNC: 139 MMOL/L (ref 136–145)
TSH SERPL DL<=0.05 MIU/L-ACNC: 1.07 UIU/ML (ref 0.27–4.2)
VIT B12 BLD-MCNC: 1604 PG/ML (ref 211–946)
WBC NRBC COR # BLD: 8.42 10*3/MM3 (ref 3.4–10.8)

## 2023-09-28 LAB
ALBUMIN SERPL ELPH-MCNC: 3.9 G/DL (ref 2.9–4.4)
ALBUMIN/GLOB SERPL: 1.2 {RATIO} (ref 0.7–1.7)
ALPHA1 GLOB SERPL ELPH-MCNC: 0.3 G/DL (ref 0–0.4)
ALPHA2 GLOB SERPL ELPH-MCNC: 0.8 G/DL (ref 0.4–1)
B-GLOBULIN SERPL ELPH-MCNC: 1 G/DL (ref 0.7–1.3)
CCP IGA+IGG SERPL IA-ACNC: 3 UNITS (ref 0–19)
GAMMA GLOB SERPL ELPH-MCNC: 1.2 G/DL (ref 0.4–1.8)
GLOBULIN SER-MCNC: 3.3 G/DL (ref 2.2–3.9)
IGA SERPL-MCNC: 151 MG/DL (ref 61–437)
IGG SERPL-MCNC: 1171 MG/DL (ref 603–1613)
IGM SERPL-MCNC: 38 MG/DL (ref 15–143)
INTERPRETATION SERPL IEP-IMP: NORMAL
LABORATORY COMMENT REPORT: NORMAL
M PROTEIN SERPL ELPH-MCNC: NORMAL G/DL
PROT SERPL-MCNC: 7.2 G/DL (ref 6–8.5)
RHEUMATOID FACT SERPL-ACNC: <10 IU/ML

## 2023-09-29 LAB
ANA SER QL IF: NEGATIVE
LABORATORY COMMENT REPORT: NORMAL

## 2023-09-30 LAB
B BURGDOR IGG PATRN SER IB-IMP: NEGATIVE
B BURGDOR IGM PATRN SER IB-IMP: NEGATIVE
B BURGDOR18KD IGG SER QL IB: NORMAL
B BURGDOR23KD IGG SER QL IB: NORMAL
B BURGDOR23KD IGM SER QL IB: NORMAL
B BURGDOR28KD IGG SER QL IB: NORMAL
B BURGDOR30KD IGG SER QL IB: NORMAL
B BURGDOR39KD IGG SER QL IB: NORMAL
B BURGDOR39KD IGM SER QL IB: NORMAL
B BURGDOR41KD IGG SER QL IB: NORMAL
B BURGDOR41KD IGM SER QL IB: NORMAL
B BURGDOR45KD IGG SER QL IB: NORMAL
B BURGDOR58KD IGG SER QL IB: NORMAL
B BURGDOR66KD IGG SER QL IB: NORMAL
B BURGDOR93KD IGG SER QL IB: NORMAL

## 2023-10-02 LAB — CRYOGLOB SER QL 1D COLD INC: NORMAL

## 2023-10-06 ENCOUNTER — PROCEDURE VISIT (OUTPATIENT)
Dept: NEUROLOGY | Facility: CLINIC | Age: 73
End: 2023-10-06
Payer: MEDICARE

## 2023-10-06 ENCOUNTER — DOCUMENTATION (OUTPATIENT)
Dept: NEUROLOGY | Facility: CLINIC | Age: 73
End: 2023-10-06
Payer: MEDICARE

## 2023-10-06 DIAGNOSIS — G60.3 IDIOPATHIC PROGRESSIVE NEUROPATHY: Primary | ICD-10-CM

## 2023-10-06 DIAGNOSIS — G56.22 ULNAR NEUROPATHY OF LEFT UPPER EXTREMITY: ICD-10-CM

## 2023-10-06 NOTE — LETTER
October 6, 2023       No Recipients    Patient: Fer Ch   YOB: 1950   Date of Visit: 10/6/2023     Dear Sujatha Diehl, ALEAH, APRN:       Thank you for referring Fer Ch to me for evaluation. Below are the relevant portions of my assessment and plan of care.    If you have questions, please do not hesitate to call me. I look forward to following Fer along with you.         Sincerely,        Timothy Gaviria MD        CC:   No Recipients    Timothy Gaviria MD  10/06/23 1333  Sign when Signing Visit      [facility]   Electrodiagnostic Laboratory    Nerve Conduction & EMG Report        Patient:   Fer Ch   Patient ID: [id number]   YOB: 1950  Sex:   male      Exam Physician:  Timothy Gaviria MD  Refer Physician:   Sujatha Diehl APRN    Electromyogram and Nerve Conduction Velocity Procedure Note    Hx: 73 y.o. right handed male with complaint of numbness involving the the upper and lower extremities. Symptoms have been present for several years and were provoked by worse at night. Significant past medical history includes nothing suggestive of neuropathy.  Family history no family history of nerve or muscle disease.    Exam: Motor power is normal. There is no atrophy. There are no fasciculations. Deep tendon reflexes are present and symmetrical. Sensory exam is abnormal distal symmetrical loss of light touch in the feet.     Edx studies of the L UE and LE were performed to evaluate for peripheral neuropathy.      NCS Examination   For sensory nerve conduction studies, the amplitude is measured peak-to-peak, the latency reported is the distal peak latency, and the conduction velocity, if measured, is determined from onset latencies and is over the forearm.   For motor nerve conduction studies, the amplitude is measured baseline-to-peak, the latency reported is the distal onset latency, the conduction velocity is calculated over the forearm,  and the F wave latency is the minimum latency.   Unless otherwise noted, the hand temperature was monitored continuously and remained between 32°C and 36°C during the performance of the NCSs.          Nerve Conduction Studies  Anti Sensory Summary Table     Stim Site NR Norm Peak (ms) O-P Amp (µV) Norm O-P Amp Onset (ms) Site1 Site2 Delta-0 (ms) Dist (cm) Johnathon (m/s) Norm Johnathon (m/s)   Left Median Anti Sensory (2nd Digit)   Wrist *NR <3.6  >10  Wrist 2nd Digit  14.0     Left Radial Anti Sensory (Base 1st Digit)   Wrist    <3.1 19.6  1.9 Wrist Base 1st Digit 1.9 0.0     Left Sural Anti Sensory (Lat Mall)   Calf *NR <4.0  >5.0  Calf Lat Mall  14.0     Left Ulnar Anti Sensory (5th Digit)   Wrist *NR <3.7  >15.0  Wrist 5th Digit  0.0       Motor Summary Table     Stim Site NR Onset (ms) Norm Onset (ms) O-P Amp (mV) Norm O-P Amp Site1 Site2 Delta-0 (ms) Dist (cm) Johnathon (m/s) Norm Johnathon (m/s)   Left Fibular Motor (Ext Dig Brev)   Ankle *NR  <6.1  >2.5         Left Fibular TA Motor (Tib Ant)   Fib Head    3.3 <4.2 1.8  Poplit Fib Head 1.9 8.0 42 >40.5   Poplit    5.2 <5.7 2.3          Left Median Motor (Abd Poll Brev)   Wrist    3.5 <4.2 *4.0 >5 Elbow Wrist 5.5 24.0 *44 >50   Elbow    9.0  3.4          Left Tibial Motor (Abd Morris Brev)   Ankle *NR  <6.1  >3.0         Left Ulnar Motor (Abd Dig Minimi)   Wrist    3.1 <4.2 *2.5 >3 B Elbow Wrist 6.2 19.0 *31 >53   B Elbow    9.3  1.4  A Elbow B Elbow 1.2 8.0 67 >53   A Elbow    10.5  1.1            F Wave Studies     NR F-Lat (ms) Lat Norm (ms) L-R F-Lat (ms) L-R Lat Norm   Left Median (Mrkrs) (Abd Poll Brev)      *34.01 <33  <2.2   Left Ulnar (Mrkrs) (Abd Dig Min)   *NR  <36  <2.5     H Reflex Studies     NR H-Lat (ms) L-R H-Lat (ms) L-R Lat Norm   Left Tibial (Mrkrs) (Gastroc)      36.04  <2.0       EMG Examination   The study was performed with a concentric needle electrode. Fibrillation and fasciculation activity is graded from none (0) to continuous (4+). The configuration and  recruitment pattern of motor unit action potentials under voluntary control, if not normal, are described below       Side Muscle Nerve Root Ins Act Fibs Psw Amp Dur Poly Recrt Int Pat Comment   Left AntTibialis Dp Br Fibular L4-5 *Incr *1+ *1+ *Incr *>12ms *3+ *Rapid *25%    Left Gastroc Tibial S1-2 *Incr *1+ *1+ *Incr *>12ms *3+ *Rapid *25%    Left BicepsFemL Sciatic L5-S2 Nml Nml Nml *Incr *>12ms *2+ *Reduced *50%    Left VastusMed Femoral L2-4 Nml Nml Nml Nml Nml 0 Nml Nml    Left Iliopsoas Femoral L2-3 Nml Nml Nml Nml Nml 0 Nml Nml    Left Deltoid Axillary C5-6 Nml Nml Nml Nml Nml 0 Nml Nml    Left Triceps Radial C6-7-8 Nml Nml Nml Nml Nml 0 Nml Nml    Left Biceps Musculocut C5-6 Nml Nml Nml Nml Nml 0 Nml Nml    Left PronatorTeres Median C6-7 Nml Nml Nml Nml Nml 0 Nml Nml    Left 1stDorInt Ulnar C8-T1 *Incr *4+ *4+ *Incr *>12ms *4+ *Rapid *25%    Left ABD Dig Min Ulnar C8-T1 *Incr *4+ *4+ *Incr *>12ms *4+ *Rapid *25%    Left FlexCarpiUln Ulnar C8,T1 *Incr *4+ *4+ *Incr *>12ms 0 *Rapid *25%        Nerve Conduction Studies  Anti Sensory Left/Right Comparison     Stim Site L Lat (ms) R Lat (ms) L-R Lat (ms) L Amp (µV) R Amp (µV) L-R Amp (%) Site1 Site2 L Johnathon (m/s) R Johnathon (m/s) L-R Johnathon (m/s)   Median Anti Sensory (2nd Digit)   Wrist       Wrist 2nd Digit      Radial Anti Sensory (Base 1st Digit)   Wrist 1.9   19.6   Wrist Base 1st Digit      Sural Anti Sensory (Lat Mall)   Calf       Calf Lat Mall      Ulnar Anti Sensory (5th Digit)   Wrist       Wrist 5th Digit        Motor Left/Right Comparison     Stim Site L Lat (ms) R Lat (ms) L-R Lat (ms) L Amp (mV) R Amp (mV) L-R Amp (%) Site1 Site2 L Johnathon (m/s) R Johnathon (m/s) L-R Johnathon (m/s)   Fibular Motor (Ext Dig Brev)   Ankle              Fibular TA Motor (Tib Ant)   Fib Head 3.3   1.8   Poplit Fib Head 42     Poplit 5.2   2.3          Median Motor (Abd Poll Brev)   Wrist 3.5   *4.0   Elbow Wrist *44     Elbow 9.0   3.4          Tibial Motor (Abd Morris Brev)   Ankle               Ulnar Motor (Abd Dig Minimi)   Wrist 3.1   *2.5   B Elbow Wrist *31     B Elbow 9.3   1.4   A Elbow B Elbow 67     A Elbow 10.5   1.1                Waveforms:                                                    NCV FINDINGS:  Evaluation of the left Fibular motor and the left tibial motor nerves showed no response (Ankle).  The left median motor nerve showed reduced amplitude (4.0 mV) and decreased conduction velocity (Elbow-Wrist, 44 m/s).  The left ulnar motor nerve showed reduced amplitude (2.5 mV) and decreased conduction velocity (B Elbow-Wrist, 31 m/s).  The left median sensory and the left ulnar sensory nerves showed no response (Wrist).  The left sural sensory nerve showed no response (Calf).  All remaining nerves (as indicated in the following tables) were within normal limits.  F Wave studies indicate that the left median F wave has prolonged latency (34.01 ms).  The left ulnar F wave has no response.      EMG FINDINGS:  Needle evaluation of the left anterior tibialis and the left gastroc muscles showed increased insertional activity, slightly increased spontaneous activity, increased motor unit amplitude, increased motor unit duration, increased polyphasic potentials, early recruitment, and very decreased interference pattern.  The left biceps femoris (long head) muscle showed increased motor unit amplitude, increased motor unit duration, moderately increased polyphasic potentials, diminished recruitment, and decreased interference pattern.  The left first dorsal interosseous and the left abductor digiti minimi muscles showed increased insertional activity, widespread spontaneous activity, increased motor unit amplitude, increased motor unit duration, widespread polyphasic potentials, early recruitment, and very decreased interference pattern.  The left flexor carpi ulnaris muscle showed increased insertional activity, widespread spontaneous activity, increased motor unit amplitude, increased motor unit  duration, early recruitment, and very decreased interference pattern.  All remaining muscles (as indicated in the following table) showed no evidence of electrical instability.     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   Severe left ulnar neuropathy at or distal to the elbow.            Instrument used:  Teca Synergy        Performed by:          Timothy Gaviria MD

## 2023-10-06 NOTE — PROGRESS NOTES
Please notify pt EMG study severe nerve damage of multiple nerves in arms and legs. There is some pressure on the nerve at the left elbow. We are still waiting for all of his tests to return.

## 2023-10-06 NOTE — PROGRESS NOTES
[facility], operated by [facility]   Electrodiagnostic Laboratory    Nerve Conduction & EMG Report        Patient:   Fer Ch   Patient ID: [id number]   YOB: 1950  Sex:   male      Exam Physician:  Timothy Gaviria MD  Refer Physician:   Sujatha COLE    Electromyogram and Nerve Conduction Velocity Procedure Note    Hx: 73 y.o. right handed male with complaint of numbness involving the the upper and lower extremities. Symptoms have been present for several years and were provoked by worse at night. Significant past medical history includes nothing suggestive of neuropathy.  Family history no family history of nerve or muscle disease.    Exam: Motor power is normal. There is no atrophy. There are no fasciculations. Deep tendon reflexes are present and symmetrical. Sensory exam is abnormal distal symmetrical loss of light touch in the feet.     Edx studies of the L UE and LE were performed to evaluate for peripheral neuropathy.      NCS Examination   For sensory nerve conduction studies, the amplitude is measured peak-to-peak, the latency reported is the distal peak latency, and the conduction velocity, if measured, is determined from onset latencies and is over the forearm.   For motor nerve conduction studies, the amplitude is measured baseline-to-peak, the latency reported is the distal onset latency, the conduction velocity is calculated over the forearm, and the F wave latency is the minimum latency.   Unless otherwise noted, the hand temperature was monitored continuously and remained between 32°C and 36°C during the performance of the NCSs.          Nerve Conduction Studies  Anti Sensory Summary Table     Stim Site NR Norm Peak (ms) O-P Amp (µV) Norm O-P Amp Onset (ms) Site1 Site2 Delta-0 (ms) Dist (cm) Johnathon (m/s) Norm Johnathon (m/s)   Left Median Anti Sensory (2nd Digit)   Wrist *NR <3.6  >10  Wrist 2nd Digit  14.0     Left Radial Anti Sensory (Base 1st Digit)   Wrist    <3.1 19.6  1.9 Wrist  Base 1st Digit 1.9 0.0     Left Sural Anti Sensory (Lat Mall)   Calf *NR <4.0  >5.0  Calf Lat Mall  14.0     Left Ulnar Anti Sensory (5th Digit)   Wrist *NR <3.7  >15.0  Wrist 5th Digit  0.0       Motor Summary Table     Stim Site NR Onset (ms) Norm Onset (ms) O-P Amp (mV) Norm O-P Amp Site1 Site2 Delta-0 (ms) Dist (cm) Johnathon (m/s) Norm Johnathon (m/s)   Left Fibular Motor (Ext Dig Brev)   Ankle *NR  <6.1  >2.5         Left Fibular TA Motor (Tib Ant)   Fib Head    3.3 <4.2 1.8  Poplit Fib Head 1.9 8.0 42 >40.5   Poplit    5.2 <5.7 2.3          Left Median Motor (Abd Poll Brev)   Wrist    3.5 <4.2 *4.0 >5 Elbow Wrist 5.5 24.0 *44 >50   Elbow    9.0  3.4          Left Tibial Motor (Abd Morris Brev)   Ankle *NR  <6.1  >3.0         Left Ulnar Motor (Abd Dig Minimi)   Wrist    3.1 <4.2 *2.5 >3 B Elbow Wrist 6.2 19.0 *31 >53   B Elbow    9.3  1.4  A Elbow B Elbow 1.2 8.0 67 >53   A Elbow    10.5  1.1            F Wave Studies     NR F-Lat (ms) Lat Norm (ms) L-R F-Lat (ms) L-R Lat Norm   Left Median (Mrkrs) (Abd Poll Brev)      *34.01 <33  <2.2   Left Ulnar (Mrkrs) (Abd Dig Min)   *NR  <36  <2.5     H Reflex Studies     NR H-Lat (ms) L-R H-Lat (ms) L-R Lat Norm   Left Tibial (Mrkrs) (Gastroc)      36.04  <2.0       EMG Examination   The study was performed with a concentric needle electrode. Fibrillation and fasciculation activity is graded from none (0) to continuous (4+). The configuration and recruitment pattern of motor unit action potentials under voluntary control, if not normal, are described below       Side Muscle Nerve Root Ins Act Fibs Psw Amp Dur Poly Recrt Int Pat Comment   Left AntTibialis Dp Br Fibular L4-5 *Incr *1+ *1+ *Incr *>12ms *3+ *Rapid *25%    Left Gastroc Tibial S1-2 *Incr *1+ *1+ *Incr *>12ms *3+ *Rapid *25%    Left BicepsFemL Sciatic L5-S2 Nml Nml Nml *Incr *>12ms *2+ *Reduced *50%    Left VastusMed Femoral L2-4 Nml Nml Nml Nml Nml 0 Nml Nml    Left Iliopsoas Femoral L2-3 Nml Nml Nml Nml Nml 0 Nml Nml     Left Deltoid Axillary C5-6 Nml Nml Nml Nml Nml 0 Nml Nml    Left Triceps Radial C6-7-8 Nml Nml Nml Nml Nml 0 Nml Nml    Left Biceps Musculocut C5-6 Nml Nml Nml Nml Nml 0 Nml Nml    Left PronatorTeres Median C6-7 Nml Nml Nml Nml Nml 0 Nml Nml    Left 1stDorInt Ulnar C8-T1 *Incr *4+ *4+ *Incr *>12ms *4+ *Rapid *25%    Left ABD Dig Min Ulnar C8-T1 *Incr *4+ *4+ *Incr *>12ms *4+ *Rapid *25%    Left FlexCarpiUln Ulnar C8,T1 *Incr *4+ *4+ *Incr *>12ms 0 *Rapid *25%        Nerve Conduction Studies  Anti Sensory Left/Right Comparison     Stim Site L Lat (ms) R Lat (ms) L-R Lat (ms) L Amp (µV) R Amp (µV) L-R Amp (%) Site1 Site2 L Johnathon (m/s) R Johnathon (m/s) L-R Johnathon (m/s)   Median Anti Sensory (2nd Digit)   Wrist       Wrist 2nd Digit      Radial Anti Sensory (Base 1st Digit)   Wrist 1.9   19.6   Wrist Base 1st Digit      Sural Anti Sensory (Lat Mall)   Calf       Calf Lat Mall      Ulnar Anti Sensory (5th Digit)   Wrist       Wrist 5th Digit        Motor Left/Right Comparison     Stim Site L Lat (ms) R Lat (ms) L-R Lat (ms) L Amp (mV) R Amp (mV) L-R Amp (%) Site1 Site2 L Johnathon (m/s) R Johnathon (m/s) L-R Johnathon (m/s)   Fibular Motor (Ext Dig Brev)   Ankle              Fibular TA Motor (Tib Ant)   Fib Head 3.3   1.8   Poplit Fib Head 42     Poplit 5.2   2.3          Median Motor (Abd Poll Brev)   Wrist 3.5   *4.0   Elbow Wrist *44     Elbow 9.0   3.4          Tibial Motor (Abd Morris Brev)   Ankle              Ulnar Motor (Abd Dig Minimi)   Wrist 3.1   *2.5   B Elbow Wrist *31     B Elbow 9.3   1.4   A Elbow B Elbow 67     A Elbow 10.5   1.1                Waveforms:                                                    NCV FINDINGS:  Evaluation of the left Fibular motor and the left tibial motor nerves showed no response (Ankle).  The left median motor nerve showed reduced amplitude (4.0 mV) and decreased conduction velocity (Elbow-Wrist, 44 m/s).  The left ulnar motor nerve showed reduced amplitude (2.5 mV) and decreased conduction velocity (B  Elbow-Wrist, 31 m/s).  The left median sensory and the left ulnar sensory nerves showed no response (Wrist).  The left sural sensory nerve showed no response (Calf).  All remaining nerves (as indicated in the following tables) were within normal limits.  F Wave studies indicate that the left median F wave has prolonged latency (34.01 ms).  The left ulnar F wave has no response.      EMG FINDINGS:  Needle evaluation of the left anterior tibialis and the left gastroc muscles showed increased insertional activity, slightly increased spontaneous activity, increased motor unit amplitude, increased motor unit duration, increased polyphasic potentials, early recruitment, and very decreased interference pattern.  The left biceps femoris (long head) muscle showed increased motor unit amplitude, increased motor unit duration, moderately increased polyphasic potentials, diminished recruitment, and decreased interference pattern.  The left first dorsal interosseous and the left abductor digiti minimi muscles showed increased insertional activity, widespread spontaneous activity, increased motor unit amplitude, increased motor unit duration, widespread polyphasic potentials, early recruitment, and very decreased interference pattern.  The left flexor carpi ulnaris muscle showed increased insertional activity, widespread spontaneous activity, increased motor unit amplitude, increased motor unit duration, early recruitment, and very decreased interference pattern.  All remaining muscles (as indicated in the following table) showed no evidence of electrical instability.     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   Severe left ulnar neuropathy at or distal to the elbow.            Instrument used:  Teca [employer]nergy        Performed by:          Timothy Gaviria MD

## 2023-10-09 ENCOUNTER — TELEPHONE (OUTPATIENT)
Dept: NEUROLOGY | Facility: CLINIC | Age: 73
End: 2023-10-09
Payer: MEDICARE

## 2023-10-09 NOTE — TELEPHONE ENCOUNTER
Called patient and spoke with wife Jamila and gave results.  She was understanding and appreciative.

## 2023-10-12 ENCOUNTER — TELEPHONE (OUTPATIENT)
Dept: NEUROLOGY | Facility: CLINIC | Age: 73
End: 2023-10-12
Payer: MEDICARE

## 2023-10-12 LAB
ARSENIC UR-MCNC: 11 UG/L (ref 0–9)
ARSENIC.INORGANIC+METHYLATED 24H UR-MCNC: <20 UG/L
ARSENIC/CREAT UR: 9 UG/G CREAT
CADMIUM 24H UR-MCNC: ABNORMAL UG/L
CREAT UR-MCNC: 1.19 G/L (ref 0.3–3)
DIMETHYLARSINATE UR-MCNC: <5 UG/L
INORG ARSENIC UR-MCNC: <10 UG/L
LEAD 24H UR-MCNC: ABNORMAL UG/L (ref 0–49)
Lab: NORMAL
MERCURY 24H UR-MCNC: ABNORMAL UG/L (ref 0–19)
MMA UR-MCNC: <5 UG/L

## 2023-10-12 NOTE — TELEPHONE ENCOUNTER
Called patient and gave wife Jamila results. She was understanding and appreciative.    ----- Message from Sujatha Diehl DNP, KELSEY sent at 10/12/2023 12:33 PM EDT -----  Please notify pt labs are stable. Initial urine test for arsenic was positive but follow up testing shows if is from a biologic source in his diet. Labs for anemia, infection, vit b12, folate, lyme disease, autoimmune connective tissue disorders, multiple myeloma,rheumatoid arthritis, thyroid disease are all normal.  Kidney function is slightly diminished but stable for him.  A1c 6.8 which indicated diabetes and should be followed by his pcp

## 2023-11-08 ENCOUNTER — HOSPITAL ENCOUNTER (OUTPATIENT)
Dept: MRI IMAGING | Facility: HOSPITAL | Age: 73
Discharge: HOME | End: 2023-11-08
Payer: MEDICARE

## 2023-11-08 ENCOUNTER — HOSPITAL ENCOUNTER (OUTPATIENT)
Dept: MRI IMAGING | Facility: HOSPITAL | Age: 73
Discharge: HOME | End: 2023-11-08
Admitting: NURSE PRACTITIONER
Payer: MEDICARE

## 2023-11-08 DIAGNOSIS — M79.2 NEUROPATHIC PAIN: ICD-10-CM

## 2023-11-08 DIAGNOSIS — R20.2 PARESTHESIA: ICD-10-CM

## 2023-11-08 PROCEDURE — 0 GADOBENATE DIMEGLUMINE 529 MG/ML SOLUTION: Performed by: NURSE PRACTITIONER

## 2023-11-08 PROCEDURE — 72156 MRI NECK SPINE W/O & W/DYE: CPT

## 2023-11-08 PROCEDURE — A9577 INJ MULTIHANCE: HCPCS | Performed by: NURSE PRACTITIONER

## 2023-11-08 PROCEDURE — 72148 MRI LUMBAR SPINE W/O DYE: CPT

## 2023-11-08 PROCEDURE — 82565 ASSAY OF CREATININE: CPT

## 2023-11-08 RX ADMIN — GADOBENATE DIMEGLUMINE 20 ML: 529 INJECTION, SOLUTION INTRAVENOUS at 15:45

## 2023-11-09 ENCOUNTER — TELEPHONE (OUTPATIENT)
Dept: NEUROLOGY | Facility: CLINIC | Age: 73
End: 2023-11-09
Payer: MEDICARE

## 2023-11-09 ENCOUNTER — TELEPHONE (OUTPATIENT)
Dept: NEUROLOGY | Facility: CLINIC | Age: 73
End: 2023-11-09

## 2023-11-09 DIAGNOSIS — M48.062 SPINAL STENOSIS OF LUMBAR REGION WITH NEUROGENIC CLAUDICATION: ICD-10-CM

## 2023-11-09 DIAGNOSIS — R20.2 PARESTHESIA: Primary | ICD-10-CM

## 2023-11-09 DIAGNOSIS — M48.00 SPINAL STENOSIS, UNSPECIFIED SPINAL REGION: ICD-10-CM

## 2023-11-09 DIAGNOSIS — M48.02 NEUROFORAMINAL STENOSIS OF CERVICAL SPINE: ICD-10-CM

## 2023-11-09 LAB — CREAT BLDA-MCNC: 1.6 MG/DL (ref 0.6–1.3)

## 2023-11-09 NOTE — TELEPHONE ENCOUNTER
I left a message for pt to call back for MRI results. MRI of c-spine and l-spine with multiple areas of spinal stenosis and neuroforaminal stenosis. Will offer referral to neurosurgery if he is willing to consider surgery. If not he is already established with pain management.

## 2023-11-09 NOTE — TELEPHONE ENCOUNTER
Called patient and spoke with wife Jamila and gave provider's note. Jamila stated that they would like the referral to Neurosurgery.

## 2023-12-04 ENCOUNTER — OFFICE VISIT (OUTPATIENT)
Dept: NEUROSURGERY | Facility: CLINIC | Age: 73
End: 2023-12-04
Payer: MEDICARE

## 2023-12-04 VITALS
DIASTOLIC BLOOD PRESSURE: 68 MMHG | HEIGHT: 71 IN | SYSTOLIC BLOOD PRESSURE: 124 MMHG | TEMPERATURE: 98.6 F | BODY MASS INDEX: 37.74 KG/M2 | WEIGHT: 269.6 LBS

## 2023-12-04 DIAGNOSIS — E66.01 CLASS 2 SEVERE OBESITY DUE TO EXCESS CALORIES WITH SERIOUS COMORBIDITY AND BODY MASS INDEX (BMI) OF 37.0 TO 37.9 IN ADULT: ICD-10-CM

## 2023-12-04 DIAGNOSIS — I12.9 HYPERTENSIVE CHRONIC KIDNEY DISEASE WITH STAGE 1 THROUGH STAGE 4 CHRONIC KIDNEY DISEASE, OR UNSPECIFIED CHRONIC KIDNEY DISEASE: ICD-10-CM

## 2023-12-04 DIAGNOSIS — G60.9 IDIOPATHIC PERIPHERAL NEUROPATHY: ICD-10-CM

## 2023-12-04 DIAGNOSIS — M51.36 DEGENERATIVE DISC DISEASE, LUMBAR: ICD-10-CM

## 2023-12-04 DIAGNOSIS — G60.9 IDIOPATHIC NEUROPATHY: Primary | ICD-10-CM

## 2023-12-04 DIAGNOSIS — G56.22 ULNAR NEUROPATHY OF LEFT UPPER EXTREMITY: ICD-10-CM

## 2023-12-04 PROCEDURE — 99204 OFFICE O/P NEW MOD 45 MIN: CPT | Performed by: NEUROLOGICAL SURGERY

## 2023-12-04 PROCEDURE — 3074F SYST BP LT 130 MM HG: CPT | Performed by: NEUROLOGICAL SURGERY

## 2023-12-04 PROCEDURE — 3078F DIAST BP <80 MM HG: CPT | Performed by: NEUROLOGICAL SURGERY

## 2023-12-04 RX ORDER — LOSARTAN POTASSIUM 50 MG/1
50 TABLET ORAL
COMMUNITY
Start: 2023-10-16

## 2023-12-04 RX ORDER — TAMSULOSIN HYDROCHLORIDE 0.4 MG/1
1 CAPSULE ORAL DAILY
COMMUNITY

## 2023-12-04 NOTE — PROGRESS NOTES
Subjective     Chief Complaint: Bilateral leg numbness, bilateral arm and hand numbness    Patient ID: Fer Ch is a 73 y.o. male seen for consultation today at the request of  XAVI Bush    History of Present Illness    This is a 73-year-old man who was referred to me by the neurology clinic for evaluation of hand and foot numbness.  He reports an approximately 10-year history of progressive foot numbness.  His numbness is slowly ascended and is now affecting him to about the level of his knees.  He also reports numbness in his hands all the way up to the level of his elbows.  He underwent a MRI of his cervical and lumbar spine which demonstrated, not unsurprisingly, some degenerative changes.  Referral to my clinic was accordingly established.  He has an extensive medical history.  He is morbidly obese.  He has heart failure with multiple stents.  He was treated for bladder cancer recently.    The following portions of the patient's history were reviewed and updated as appropriate: allergies, current medications, past family history, past medical history, past social history, past surgical history and problem list.    Family history:   Family History   Problem Relation Age of Onset    Cancer Father        Social history:   Social History     Socioeconomic History    Marital status:    Tobacco Use    Smoking status: Former     Packs/day: 1.00     Years: 50.00     Additional pack years: 0.00     Total pack years: 50.00     Types: Cigarettes     Quit date: 2018     Years since quittin.6     Passive exposure: Past    Smokeless tobacco: Never   Substance and Sexual Activity    Alcohol use: Yes     Alcohol/week: 4.0 standard drinks of alcohol     Types: 1 Glasses of wine, 1 Cans of beer, 2 Shots of liquor per week     Comment: Some weeks none    Drug use: Never    Sexual activity: Not Currently     Partners: Female       Review of Systems   Constitutional:  Positive for chills,  diaphoresis and fatigue. Negative for activity change, appetite change, fever and unexpected weight change.   HENT:  Positive for hearing loss and sneezing. Negative for congestion, dental problem, drooling, ear discharge, ear pain, facial swelling, mouth sores, nosebleeds, postnasal drip, rhinorrhea, sinus pressure, sinus pain, sore throat, tinnitus, trouble swallowing and voice change.    Eyes:  Negative for photophobia, pain, discharge, redness, itching and visual disturbance.   Respiratory:  Positive for shortness of breath and stridor. Negative for apnea, cough, choking, chest tightness and wheezing.    Cardiovascular:  Positive for leg swelling. Negative for chest pain and palpitations.   Gastrointestinal:  Negative for abdominal distention, abdominal pain, anal bleeding, blood in stool, constipation, diarrhea, nausea, rectal pain and vomiting.   Endocrine: Positive for polydipsia and polyuria. Negative for cold intolerance, heat intolerance and polyphagia.   Genitourinary:  Negative for decreased urine volume, difficulty urinating, dysuria, enuresis, flank pain, frequency, genital sores, hematuria, penile discharge, penile pain, penile swelling, scrotal swelling, testicular pain and urgency.   Musculoskeletal:  Positive for joint swelling. Negative for arthralgias, back pain, gait problem, myalgias, neck pain and neck stiffness.   Skin:  Positive for rash and wound. Negative for color change and pallor.   Allergic/Immunologic: Negative for environmental allergies, food allergies and immunocompromised state.   Neurological:  Positive for numbness. Negative for dizziness, tremors, seizures, syncope, facial asymmetry, speech difficulty, weakness, light-headedness and headaches.   Hematological:  Negative for adenopathy. Does not bruise/bleed easily.   Psychiatric/Behavioral:  Negative for agitation, behavioral problems, confusion, decreased concentration, dysphoric mood, hallucinations, self-injury, sleep  "disturbance and suicidal ideas. The patient is not nervous/anxious and is not hyperactive.        Objective   Blood pressure 124/68, temperature 98.6 °F (37 °C), temperature source Infrared, height 180.3 cm (70.98\"), weight 122 kg (269 lb 9.6 oz).  Body mass index is 37.62 kg/m².    Physical Exam  Vitals reviewed.   Constitutional:       General: He is not in acute distress.     Appearance: He is well-developed. He is morbidly obese. He is ill-appearing. He is not diaphoretic.   HENT:      Head: Normocephalic and atraumatic.   Pulmonary:      Effort: Pulmonary effort is normal.      Comments: Increased work of breathing at rest  Lymphadenopathy:      Comments: Pitting edema bilaterally pretibially.   Skin:     General: Skin is warm and dry.   Neurological:      Mental Status: He is alert and oriented to person, place, and time.      Deep Tendon Reflexes: Reflexes abnormal.      Reflex Scores:       Patellar reflexes are 0 on the right side and 0 on the left side.       Achilles reflexes are 0 on the right side and 0 on the left side.  Psychiatric:         Behavior: Behavior normal.         Assessment & Plan     Independent Review of Radiographic Studies:      Available for my review is a MRI of the cervical spine which was performed on November 8, 2023.  There are diffuse degenerative changes throughout the cervical spine.  The data set, particularly in the axial plane, is contaminated with significant patient motion.  Also, presumably due to his body habitus, axial evaluations of the lower cervical spine is particularly problematic.  Given the aforementioned limitations, I do not appreciate any obvious foci of significant central canal stenosis.  There is multifocal lateral recess and intraforaminal stenosis which again is hard to quantify, but is diffuse and located throughout the entire subaxial spine.  There appears to be autofusion of the C4-5 disc space which may be degenerative in nature, or may be " postinfectious.  I do not appreciate any abnormal enhancement within the vertebral bodies or disc space.    Available also for my review is a MRI of the lumbar spine which was performed on November 8, 2023.  Lumbar lordosis has been decreased.  There is diffuse degenerative disc disease from L1 down to the sacrum.  I do not appreciate any significant central canal stenosis.  There is diffuse lateral recess stenosis which is mild, possibly bordering on moderate.  There is for severe facet arthropathy at L5-S1 particularly on the right side as well as significant sacroiliac inflammation on the right side as well.  There has been significant fatty replacement of the paraspinous musculature.  Medical Decision Making:      This is a 73-year-old man with what appears to be severe peripheral neuropathy.  There is no role for surgical intervention in the management of this gentleman symptoms.  He does have ulnar neuropathy on the left side, however his peripheral neuropathy is so bad that he is not really able to differentiate any difference in numbness in his fourth and fifth digits as compared to his first second and third digits.  For this reason, I do not think an ulnar nerve decompression is likely to help him all that much with the symptoms.    I would like to refer him back to neurology so they can complete the workup of his peripheral neuropathy.  I be happy to follow-up with him on an as-needed basis for new or worsening symptoms.    Diagnoses and all orders for this visit:    1. Idiopathic neuropathy (Primary)  -     Ambulatory Referral to Neurology  -     Ambulatory Referral to Pain Management    2. Class 2 severe obesity due to excess calories with serious comorbidity and body mass index (BMI) of 37.0 to 37.9 in adult    3. Hypertensive chronic kidney disease with stage 1 through stage 4 chronic kidney disease, or unspecified chronic kidney disease    4. Idiopathic peripheral neuropathy    5. Degenerative disc  disease, lumbar    6. Ulnar neuropathy of left upper extremity        No follow-ups on file.           This document signed by MALCOM Colby MD December 4, 2023 13:59 EST

## 2023-12-27 RX ORDER — CARVEDILOL 6.25 MG/1
6.25 TABLET ORAL 2 TIMES DAILY WITH MEALS
Qty: 180 TABLET | Refills: 0 | Status: SHIPPED | OUTPATIENT
Start: 2023-12-27

## 2024-01-04 ENCOUNTER — OFFICE VISIT (OUTPATIENT)
Dept: CARDIOLOGY | Facility: CLINIC | Age: 74
End: 2024-01-04
Payer: MEDICARE

## 2024-01-04 VITALS
DIASTOLIC BLOOD PRESSURE: 87 MMHG | WEIGHT: 270 LBS | HEIGHT: 70 IN | SYSTOLIC BLOOD PRESSURE: 140 MMHG | BODY MASS INDEX: 38.65 KG/M2 | OXYGEN SATURATION: 97 % | HEART RATE: 89 BPM

## 2024-01-04 DIAGNOSIS — I10 PRIMARY HYPERTENSION: ICD-10-CM

## 2024-01-04 DIAGNOSIS — I25.10 CORONARY ARTERY DISEASE INVOLVING NATIVE CORONARY ARTERY OF NATIVE HEART WITHOUT ANGINA PECTORIS: Primary | ICD-10-CM

## 2024-01-04 DIAGNOSIS — E78.5 DYSLIPIDEMIA: ICD-10-CM

## 2024-01-04 PROCEDURE — 3077F SYST BP >= 140 MM HG: CPT | Performed by: PHYSICIAN ASSISTANT

## 2024-01-04 PROCEDURE — 3079F DIAST BP 80-89 MM HG: CPT | Performed by: PHYSICIAN ASSISTANT

## 2024-01-04 PROCEDURE — 99213 OFFICE O/P EST LOW 20 MIN: CPT | Performed by: PHYSICIAN ASSISTANT

## 2024-01-04 NOTE — PROGRESS NOTES
Problem list     Subjective   Fer Ch is a 73 y.o. male     Chief Complaint   Patient presents with    Follow-up     Testing   Problem list  1.  Coronary artery disease  1.1  History of stenting x3 in [place], inadequate data  1.2 Stress test June 2023 with no evidence of ischemia preserved LV function  2.  Preserved systolic function  3.  Hypertension  4.  Dyslipidemia    HPI    Patient is a 73-year-old male who presents to the office for routine assessment.    Patient has done well from a cardiac standpoint.  He does not describe chest pain or pressure.  Patient does have a degree of dyspnea at baseline.  Patient does not complain of progressive dyspnea.  No complaints of PND or orthopnea.    Patient does not describe palpitating nor does he plan of dysrhythmic symptoms.  He is stable otherwise.    Current Outpatient Medications on File Prior to Visit   Medication Sig Dispense Refill    allopurinol (ZYLOPRIM) 100 MG tablet Take 1 tablet by mouth Daily. 90 tablet 0    allopurinol (ZYLOPRIM) 300 MG tablet Take 1 tablet by mouth Daily.      ASPIRIN 81 PO Take 1 tablet by mouth Daily.      carvedilol (COREG) 6.25 MG tablet Take 1 tablet by mouth 2 (Two) Times a Day With Meals. 180 tablet 0    fenofibrate (Tricor) 145 MG tablet Take 1 tablet by mouth Daily. 90 tablet 0    magnesium oxide (MAG-OX) 400 MG tablet Take 1 tablet by mouth Daily.      multivitamin (THERAGRAN) tablet tablet Take  by mouth Daily.      Quercetin 500 MG capsule Take 355 mg by mouth Daily.      tamsulosin (FLOMAX) 0.4 MG capsule 24 hr capsule Take 1 capsule by mouth Daily.      torsemide (DEMADEX) 100 MG tablet Take 1 tablet by mouth Daily.      TRAZODONE HCL PO Take  by mouth As Needed.      Turmeric-Ginger 150-25 MG chewable tablet Chew 2,360 mg Daily. Turmeric, Bioerine, Garlic, ginger.      vitamin B-12 (CYANOCOBALAMIN) 1000 MCG tablet Take 2.5 tablets by mouth Daily.      vitamin C (ASCORBIC ACID) 250 MG tablet Take 2 tablets by  mouth Daily.      vitamin D3 125 MCG (5000 UT) capsule capsule Take 1 capsule by mouth Daily.      Zinc 50 MG tablet Take 1 tablet by mouth Daily.      [DISCONTINUED] losartan (COZAAR) 50 MG tablet 1 tablet.       No current facility-administered medications on file prior to visit.       Atorvastatin and Potassium-containing compounds    Past Medical History:   Diagnosis Date    Arthritis     Bladder cancer     Finished tx in 2022    CAD (coronary artery disease)     Diastolic congestive heart failure     Difficulty walking     Dyspnea     Gout     HL (hearing loss)     Hypertension     Idiopathic peripheral neuropathy 2023    Knee pain     Low back pain     Lymphedema     Peripheral neuropathy        Social History     Socioeconomic History    Marital status:    Tobacco Use    Smoking status: Former     Packs/day: 1.00     Years: 50.00     Additional pack years: 0.00     Total pack years: 50.00     Types: Cigarettes     Quit date: 2018     Years since quittin.7     Passive exposure: Past    Smokeless tobacco: Never   Substance and Sexual Activity    Alcohol use: Yes     Alcohol/week: 4.0 standard drinks of alcohol     Types: 1 Glasses of wine, 1 Cans of beer, 2 Shots of liquor per week     Comment: Some weeks none    Drug use: Never    Sexual activity: Not Currently     Partners: Female       Family History   Problem Relation Age of Onset    Cancer Father        Review of Systems   Constitutional: Negative.  Negative for activity change, appetite change, chills, fatigue and fever.   HENT: Negative.  Negative for congestion, sinus pressure and sinus pain.    Eyes: Negative.  Negative for visual disturbance.   Respiratory:  Positive for shortness of breath. Negative for apnea, cough and wheezing.    Cardiovascular: Negative.  Negative for chest pain, palpitations and leg swelling.   Gastrointestinal: Negative.  Negative for blood in stool.   Endocrine: Negative.  Negative for cold  "intolerance and heat intolerance.   Genitourinary: Negative.  Negative for hematuria.   Musculoskeletal:  Negative for arthralgias, back pain, joint swelling, neck pain and neck stiffness.   Skin: Negative.  Negative for color change, rash and wound.   Allergic/Immunologic: Negative.  Negative for environmental allergies and food allergies.   Neurological:  Negative for dizziness, syncope, weakness, light-headedness, numbness and headaches.   Hematological:  Bruises/bleeds easily.   Psychiatric/Behavioral: Negative.  Negative for sleep disturbance.        Objective   Vitals:    01/04/24 1338   BP: 140/87   BP Location: Left arm   Patient Position: Sitting   Cuff Size: Adult   Pulse: 89   SpO2: 97%   Weight: 122 kg (270 lb)   Height: 177.8 cm (70\")      /87 (BP Location: Left arm, Patient Position: Sitting, Cuff Size: Adult)   Pulse 89   Ht 177.8 cm (70\")   Wt 122 kg (270 lb)   SpO2 97%   BMI 38.74 kg/m²     Lab Results (most recent)       None            Physical Exam  Vitals and nursing note reviewed.   Constitutional:       General: He is not in acute distress.     Appearance: Normal appearance. He is well-developed.   HENT:      Head: Normocephalic and atraumatic.   Eyes:      General: No scleral icterus.        Right eye: No discharge.         Left eye: No discharge.      Conjunctiva/sclera: Conjunctivae normal.   Neck:      Vascular: No carotid bruit.   Cardiovascular:      Rate and Rhythm: Normal rate and regular rhythm.      Heart sounds: Normal heart sounds. No murmur heard.     No friction rub. No gallop.   Pulmonary:      Effort: Pulmonary effort is normal. No respiratory distress.      Breath sounds: Normal breath sounds. No wheezing or rales.   Chest:      Chest wall: No tenderness.   Musculoskeletal:      Right lower leg: No edema.      Left lower leg: No edema.   Skin:     General: Skin is warm and dry.      Coloration: Skin is not pale.      Findings: No erythema or rash.   Neurological:    "   Mental Status: He is alert and oriented to person, place, and time.      Cranial Nerves: No cranial nerve deficit.   Psychiatric:         Behavior: Behavior normal.         Procedure   Procedures       Assessment & Plan     Problems Addressed this Visit          Cardiac and Vasculature    Primary hypertension    Dyslipidemia    Coronary artery disease involving native coronary artery of native heart without angina pectoris - Primary     Diagnoses         Codes Comments    Coronary artery disease involving native coronary artery of native heart without angina pectoris    -  Primary ICD-10-CM: I25.10  ICD-9-CM: 414.01     Primary hypertension     ICD-10-CM: I10  ICD-9-CM: 401.9     Dyslipidemia     ICD-10-CM: E78.5  ICD-9-CM: 272.4           Recommendation  1.  Patient is a 73-year-old male who presents to the office for evaluation with history of coronary disease.  Patient does not experience angina.  Patient had recent stress testing with no evidence of ischemia.  He has no chest pain with mild levels of dyspnea.  For now, we will continue risk factor modification.    2.  Patient's blood pressure is slightly elevated today.  It has been much better the last few visits.  We can continue to monitor and have him monitor it at home.    3.  I reviewed lipid parameters.  I would recommend statin therapy.  I understand atorvastatin did cause some issues with myalgia.  His LDL by last evaluation was in the 80s.  Patient follows with PCP.    4.  For now, we will continue the same and see him back for follow-up in 6 months or sooner if needed.  Follow-up with primary as scheduled.         Patient did not bring med list or medicine bottles to appointment, med list has been reviewed and updated based on patient's knowledge of their meds.      Advance Care Planning   ACP discussion was declined by the patient. Patient has an advance directive (not in EMR), copy requested.                 Electronically signed by:

## 2024-01-04 NOTE — LETTER
January 4, 2024       No Recipients    Patient: Fer Ch   YOB: 1950   Date of Visit: 1/4/2024       Dear KELSEY Webb    Fer Ch was in my office today. Below is a copy of my note.    If you have questions, please do not hesitate to call me. I look forward to following Fer along with you.         Sincerely,        JOSE DE JESUS Steele        CC:   No Recipients    Problem list     Subjective  Fer Ch is a 73 y.o. male     Chief Complaint   Patient presents with   • Follow-up     Testing   Problem list  1.  Coronary artery disease  1.1  History of stenting x3 in [place], inadequate data  1.2 Stress test June 2023 with no evidence of ischemia preserved LV function  2.  Preserved systolic function  3.  Hypertension  4.  Dyslipidemia    HPI    Patient is a 73-year-old male who presents to the office for routine assessment.    Patient has done well from a cardiac standpoint.  He does not describe chest pain or pressure.  Patient does have a degree of dyspnea at baseline.  Patient does not complain of progressive dyspnea.  No complaints of PND or orthopnea.    Patient does not describe palpitating nor does he plan of dysrhythmic symptoms.  He is stable otherwise.    Current Outpatient Medications on File Prior to Visit   Medication Sig Dispense Refill   • allopurinol (ZYLOPRIM) 100 MG tablet Take 1 tablet by mouth Daily. 90 tablet 0   • allopurinol (ZYLOPRIM) 300 MG tablet Take 1 tablet by mouth Daily.     • ASPIRIN 81 PO Take 1 tablet by mouth Daily.     • carvedilol (COREG) 6.25 MG tablet Take 1 tablet by mouth 2 (Two) Times a Day With Meals. 180 tablet 0   • fenofibrate (Tricor) 145 MG tablet Take 1 tablet by mouth Daily. 90 tablet 0   • magnesium oxide (MAG-OX) 400 MG tablet Take 1 tablet by mouth Daily.     • multivitamin (THERAGRAN) tablet tablet Take  by mouth Daily.     • Quercetin 500 MG capsule Take 355 mg by mouth Daily.     • tamsulosin (FLOMAX) 0.4 MG  capsule 24 hr capsule Take 1 capsule by mouth Daily.     • torsemide (DEMADEX) 100 MG tablet Take 1 tablet by mouth Daily.     • TRAZODONE HCL PO Take  by mouth As Needed.     • Turmeric-Ginger 150-25 MG chewable tablet Chew 2,360 mg Daily. Turmeric, Bioerine, Garlic, ginger.     • vitamin B-12 (CYANOCOBALAMIN) 1000 MCG tablet Take 2.5 tablets by mouth Daily.     • vitamin C (ASCORBIC ACID) 250 MG tablet Take 2 tablets by mouth Daily.     • vitamin D3 125 MCG (5000 UT) capsule capsule Take 1 capsule by mouth Daily.     • Zinc 50 MG tablet Take 1 tablet by mouth Daily.     • [DISCONTINUED] losartan (COZAAR) 50 MG tablet 1 tablet.       No current facility-administered medications on file prior to visit.       Atorvastatin and Potassium-containing compounds    Past Medical History:   Diagnosis Date   • Arthritis    • Bladder cancer     Finished tx in 2022   • CAD (coronary artery disease)    • Diastolic congestive heart failure    • Difficulty walking    • Dyspnea    • Gout    • HL (hearing loss)    • Hypertension    • Idiopathic peripheral neuropathy 2023   • Knee pain    • Low back pain    • Lymphedema    • Peripheral neuropathy        Social History     Socioeconomic History   • Marital status:    Tobacco Use   • Smoking status: Former     Packs/day: 1.00     Years: 50.00     Additional pack years: 0.00     Total pack years: 50.00     Types: Cigarettes     Quit date: 2018     Years since quittin.7     Passive exposure: Past   • Smokeless tobacco: Never   Substance and Sexual Activity   • Alcohol use: Yes     Alcohol/week: 4.0 standard drinks of alcohol     Types: 1 Glasses of wine, 1 Cans of beer, 2 Shots of liquor per week     Comment: Some weeks none   • Drug use: Never   • Sexual activity: Not Currently     Partners: Female       Family History   Problem Relation Age of Onset   • Cancer Father        Review of Systems   Constitutional: Negative.  Negative for activity change,  "appetite change, chills, fatigue and fever.   HENT: Negative.  Negative for congestion, sinus pressure and sinus pain.    Eyes: Negative.  Negative for visual disturbance.   Respiratory:  Positive for shortness of breath. Negative for apnea, cough and wheezing.    Cardiovascular: Negative.  Negative for chest pain, palpitations and leg swelling.   Gastrointestinal: Negative.  Negative for blood in stool.   Endocrine: Negative.  Negative for cold intolerance and heat intolerance.   Genitourinary: Negative.  Negative for hematuria.   Musculoskeletal:  Negative for arthralgias, back pain, joint swelling, neck pain and neck stiffness.   Skin: Negative.  Negative for color change, rash and wound.   Allergic/Immunologic: Negative.  Negative for environmental allergies and food allergies.   Neurological:  Negative for dizziness, syncope, weakness, light-headedness, numbness and headaches.   Hematological:  Bruises/bleeds easily.   Psychiatric/Behavioral: Negative.  Negative for sleep disturbance.        Objective  Vitals:    01/04/24 1338   BP: 140/87   BP Location: Left arm   Patient Position: Sitting   Cuff Size: Adult   Pulse: 89   SpO2: 97%   Weight: 122 kg (270 lb)   Height: 177.8 cm (70\")      /87 (BP Location: Left arm, Patient Position: Sitting, Cuff Size: Adult)   Pulse 89   Ht 177.8 cm (70\")   Wt 122 kg (270 lb)   SpO2 97%   BMI 38.74 kg/m²     Lab Results (most recent)       None            Physical Exam  Vitals and nursing note reviewed.   Constitutional:       General: He is not in acute distress.     Appearance: Normal appearance. He is well-developed.   HENT:      Head: Normocephalic and atraumatic.   Eyes:      General: No scleral icterus.        Right eye: No discharge.         Left eye: No discharge.      Conjunctiva/sclera: Conjunctivae normal.   Neck:      Vascular: No carotid bruit.   Cardiovascular:      Rate and Rhythm: Normal rate and regular rhythm.      Heart sounds: Normal heart " sounds. No murmur heard.     No friction rub. No gallop.   Pulmonary:      Effort: Pulmonary effort is normal. No respiratory distress.      Breath sounds: Normal breath sounds. No wheezing or rales.   Chest:      Chest wall: No tenderness.   Musculoskeletal:      Right lower leg: No edema.      Left lower leg: No edema.   Skin:     General: Skin is warm and dry.      Coloration: Skin is not pale.      Findings: No erythema or rash.   Neurological:      Mental Status: He is alert and oriented to person, place, and time.      Cranial Nerves: No cranial nerve deficit.   Psychiatric:         Behavior: Behavior normal.         Procedure  Procedures       Assessment & Plan    Problems Addressed this Visit          Cardiac and Vasculature    Primary hypertension    Dyslipidemia    Coronary artery disease involving native coronary artery of native heart without angina pectoris - Primary     Diagnoses         Codes Comments    Coronary artery disease involving native coronary artery of native heart without angina pectoris    -  Primary ICD-10-CM: I25.10  ICD-9-CM: 414.01     Primary hypertension     ICD-10-CM: I10  ICD-9-CM: 401.9     Dyslipidemia     ICD-10-CM: E78.5  ICD-9-CM: 272.4           Recommendation  1.  Patient is a 73-year-old male who presents to the office for evaluation with history of coronary disease.  Patient does not experience angina.  Patient had recent stress testing with no evidence of ischemia.  He has no chest pain with mild levels of dyspnea.  For now, we will continue risk factor modification.    2.  Patient's blood pressure is slightly elevated today.  It has been much better the last few visits.  We can continue to monitor and have him monitor it at home.    3.  I reviewed lipid parameters.  I would recommend statin therapy.  I understand atorvastatin did cause some issues with myalgia.  His LDL by last evaluation was in the 80s.  Patient follows with PCP.    4.  For now, we will continue the  same and see him back for follow-up in 6 months or sooner if needed.  Follow-up with primary as scheduled.         Patient did not bring med list or medicine bottles to appointment, med list has been reviewed and updated based on patient's knowledge of their meds.      Advance Care Planning  ACP discussion was declined by the patient. Patient has an advance directive (not in EMR), copy requested.                 Electronically signed by:

## 2024-01-27 DIAGNOSIS — M1A.9XX0 CHRONIC GOUT WITHOUT TOPHUS, UNSPECIFIED CAUSE, UNSPECIFIED SITE: Primary | ICD-10-CM

## 2024-01-29 ENCOUNTER — TELEPHONE (OUTPATIENT)
Dept: FAMILY MEDICINE CLINIC | Facility: CLINIC | Age: 74
End: 2024-01-29
Payer: MEDICARE

## 2024-01-29 NOTE — TELEPHONE ENCOUNTER
"Relay     \"We got a refill request for Allopurinol 100mg. But we have both  100mg and 300mg listed in your chart. Which one do you currently take?\"                 "

## 2024-01-29 NOTE — TELEPHONE ENCOUNTER
Name: TIFFANIE DE LUNA    Relationship: Emergency Contact    Best Callback Number: [phone]    HUB PROVIDED THE RELAY MESSAGE FROM THE OFFICE   PATIENT VOICED UNDERSTANDING AND HAS NO FURTHER QUESTIONS AT THIS TIME    ADDITIONAL INFORMATION: PT SPOUSE TIFFANIE CALL BACK STATED THAT PT TAKES BOTH HE TAKES 300MG IN AM AND 100MG IN EVENING.

## 2024-01-30 RX ORDER — ALLOPURINOL 100 MG/1
100 TABLET ORAL EVERY EVENING
Qty: 30 TABLET | Refills: 2 | Status: SHIPPED | OUTPATIENT
Start: 2024-01-30

## 2024-01-30 RX ORDER — ALLOPURINOL 300 MG/1
300 TABLET ORAL EVERY MORNING
Qty: 30 TABLET | Refills: 2 | Status: SHIPPED | OUTPATIENT
Start: 2024-01-30

## 2024-01-30 NOTE — TELEPHONE ENCOUNTER
Rx Refill Note  Requested Prescriptions     Pending Prescriptions Disp Refills    allopurinol (ZYLOPRIM) 100 MG tablet [Pharmacy Med Name: ALLOPURINOL 100 MG TABLET] 30 tablet 2     Sig: Take 1 tablet by mouth Every Evening.    allopurinol (ZYLOPRIM) 300 MG tablet 30 tablet 2     Sig: Take 1 tablet by mouth Every Morning.      Last office visit with prescribing clinician: 9/1/2023   Last telemedicine visit with prescribing clinician: Visit date not found   Next office visit with prescribing clinician: 3/8/2024                         Would you like a call back once the refill request has been completed: [] Yes [] No    If the office needs to give you a call back, can they leave a voicemail: [] Yes [] No    Nona Quinteros MA  01/30/24, 10:22 EST

## 2024-03-02 DIAGNOSIS — E78.5 HYPERLIPIDEMIA, UNSPECIFIED HYPERLIPIDEMIA TYPE: ICD-10-CM

## 2024-03-04 RX ORDER — FENOFIBRATE 48 MG/1
TABLET, COATED ORAL
Qty: 90 TABLET | Refills: 0 | OUTPATIENT
Start: 2024-03-04

## 2024-03-08 ENCOUNTER — OFFICE VISIT (OUTPATIENT)
Dept: FAMILY MEDICINE CLINIC | Facility: CLINIC | Age: 74
End: 2024-03-08
Payer: MEDICARE

## 2024-03-08 VITALS
WEIGHT: 276 LBS | OXYGEN SATURATION: 89 % | RESPIRATION RATE: 18 BRPM | SYSTOLIC BLOOD PRESSURE: 130 MMHG | DIASTOLIC BLOOD PRESSURE: 82 MMHG | HEIGHT: 70 IN | HEART RATE: 95 BPM | TEMPERATURE: 98 F | BODY MASS INDEX: 39.51 KG/M2

## 2024-03-08 DIAGNOSIS — M1A.9XX0 CHRONIC GOUT WITHOUT TOPHUS, UNSPECIFIED CAUSE, UNSPECIFIED SITE: ICD-10-CM

## 2024-03-08 DIAGNOSIS — E55.9 VITAMIN D DEFICIENCY: ICD-10-CM

## 2024-03-08 DIAGNOSIS — I10 ESSENTIAL HYPERTENSION: Primary | ICD-10-CM

## 2024-03-08 DIAGNOSIS — E78.5 HYPERLIPIDEMIA, UNSPECIFIED HYPERLIPIDEMIA TYPE: ICD-10-CM

## 2024-03-08 DIAGNOSIS — R73.9 HYPERGLYCEMIA: ICD-10-CM

## 2024-03-08 DIAGNOSIS — R53.83 FATIGUE, UNSPECIFIED TYPE: ICD-10-CM

## 2024-03-08 LAB
ALBUMIN SERPL-MCNC: 4.5 G/DL (ref 3.5–5.2)
ALBUMIN/GLOB SERPL: 1.4 G/DL
ALP SERPL-CCNC: 58 U/L (ref 39–117)
ALT SERPL W P-5'-P-CCNC: 39 U/L (ref 1–41)
ANION GAP SERPL CALCULATED.3IONS-SCNC: 13.4 MMOL/L (ref 5–15)
AST SERPL-CCNC: 25 U/L (ref 1–40)
BASOPHILS # BLD AUTO: 0.04 10*3/MM3 (ref 0–0.2)
BASOPHILS NFR BLD AUTO: 0.6 % (ref 0–1.5)
BILIRUB SERPL-MCNC: 0.5 MG/DL (ref 0–1.2)
BUN SERPL-MCNC: 20 MG/DL (ref 8–23)
BUN/CREAT SERPL: 13.7 (ref 7–25)
CALCIUM SPEC-SCNC: 9.5 MG/DL (ref 8.6–10.5)
CHLORIDE SERPL-SCNC: 98 MMOL/L (ref 98–107)
CHOLEST SERPL-MCNC: 217 MG/DL (ref 0–200)
CO2 SERPL-SCNC: 28.6 MMOL/L (ref 22–29)
CREAT SERPL-MCNC: 1.46 MG/DL (ref 0.76–1.27)
DEPRECATED RDW RBC AUTO: 54.8 FL (ref 37–54)
EGFRCR SERPLBLD CKD-EPI 2021: 51.1 ML/MIN/1.73
EOSINOPHIL # BLD AUTO: 0.16 10*3/MM3 (ref 0–0.4)
EOSINOPHIL NFR BLD AUTO: 2.2 % (ref 0.3–6.2)
ERYTHROCYTE [DISTWIDTH] IN BLOOD BY AUTOMATED COUNT: 13.9 % (ref 12.3–15.4)
GLOBULIN UR ELPH-MCNC: 3.2 GM/DL
GLUCOSE SERPL-MCNC: 142 MG/DL (ref 65–99)
HCT VFR BLD AUTO: 42.9 % (ref 37.5–51)
HDLC SERPL-MCNC: 33 MG/DL (ref 40–60)
HGB BLD-MCNC: 14.7 G/DL (ref 13–17.7)
IMM GRANULOCYTES # BLD AUTO: 0.02 10*3/MM3 (ref 0–0.05)
IMM GRANULOCYTES NFR BLD AUTO: 0.3 % (ref 0–0.5)
LDLC SERPL CALC-MCNC: 117 MG/DL (ref 0–100)
LDLC/HDLC SERPL: 3.25 {RATIO}
LYMPHOCYTES # BLD AUTO: 1.59 10*3/MM3 (ref 0.7–3.1)
LYMPHOCYTES NFR BLD AUTO: 21.9 % (ref 19.6–45.3)
MCH RBC QN AUTO: 36.6 PG (ref 26.6–33)
MCHC RBC AUTO-ENTMCNC: 34.3 G/DL (ref 31.5–35.7)
MCV RBC AUTO: 106.7 FL (ref 79–97)
MONOCYTES # BLD AUTO: 0.74 10*3/MM3 (ref 0.1–0.9)
MONOCYTES NFR BLD AUTO: 10.2 % (ref 5–12)
NEUTROPHILS NFR BLD AUTO: 4.72 10*3/MM3 (ref 1.7–7)
NEUTROPHILS NFR BLD AUTO: 64.8 % (ref 42.7–76)
NRBC BLD AUTO-RTO: 0 /100 WBC (ref 0–0.2)
PLATELET # BLD AUTO: 190 10*3/MM3 (ref 140–450)
PMV BLD AUTO: 10 FL (ref 6–12)
POTASSIUM SERPL-SCNC: 4.1 MMOL/L (ref 3.5–5.2)
PROT SERPL-MCNC: 7.7 G/DL (ref 6–8.5)
RBC # BLD AUTO: 4.02 10*6/MM3 (ref 4.14–5.8)
SODIUM SERPL-SCNC: 140 MMOL/L (ref 136–145)
TRIGL SERPL-MCNC: 383 MG/DL (ref 0–150)
TSH SERPL DL<=0.05 MIU/L-ACNC: 1.13 UIU/ML (ref 0.27–4.2)
VLDLC SERPL-MCNC: 67 MG/DL (ref 5–40)
WBC NRBC COR # BLD AUTO: 7.27 10*3/MM3 (ref 3.4–10.8)

## 2024-03-08 PROCEDURE — 82043 UR ALBUMIN QUANTITATIVE: CPT | Performed by: NURSE PRACTITIONER

## 2024-03-08 PROCEDURE — 80061 LIPID PANEL: CPT | Performed by: NURSE PRACTITIONER

## 2024-03-08 PROCEDURE — 3079F DIAST BP 80-89 MM HG: CPT | Performed by: NURSE PRACTITIONER

## 2024-03-08 PROCEDURE — 1159F MED LIST DOCD IN RCRD: CPT | Performed by: NURSE PRACTITIONER

## 2024-03-08 PROCEDURE — 3075F SYST BP GE 130 - 139MM HG: CPT | Performed by: NURSE PRACTITIONER

## 2024-03-08 PROCEDURE — 84443 ASSAY THYROID STIM HORMONE: CPT | Performed by: NURSE PRACTITIONER

## 2024-03-08 PROCEDURE — 99214 OFFICE O/P EST MOD 30 MIN: CPT | Performed by: NURSE PRACTITIONER

## 2024-03-08 PROCEDURE — 82607 VITAMIN B-12: CPT | Performed by: NURSE PRACTITIONER

## 2024-03-08 PROCEDURE — 82306 VITAMIN D 25 HYDROXY: CPT | Performed by: NURSE PRACTITIONER

## 2024-03-08 PROCEDURE — 1160F RVW MEDS BY RX/DR IN RCRD: CPT | Performed by: NURSE PRACTITIONER

## 2024-03-08 PROCEDURE — 85025 COMPLETE CBC W/AUTO DIFF WBC: CPT | Performed by: NURSE PRACTITIONER

## 2024-03-08 PROCEDURE — 80053 COMPREHEN METABOLIC PANEL: CPT | Performed by: NURSE PRACTITIONER

## 2024-03-08 NOTE — PROGRESS NOTES
"Chief Complaint  Follow-up (Cont. Care of Obesity; HTN; Gout)    Subjective        Fer Ch presents to [facility] for follow up (htn; gout).    Hypertension  This is a chronic problem. The current episode started more than 1 year ago. The problem is unchanged. The problem is controlled. Associated symptoms include anxiety, blurred vision, headaches, malaise/fatigue, peripheral edema and shortness of breath. Pertinent negatives include no chest pain, neck pain, orthopnea, palpitations, PND or sweats. Risk factors for coronary artery disease include obesity, male gender, dyslipidemia, sedentary lifestyle and stress.     Other  This is a chronic (Gout) problem. Episode onset: \"years\" The problem has been waxing and waning. Associated symptoms include arthralgias, a change in bowel habit, headaches, joint swelling, myalgias and numbness. Pertinent negatives include no abdominal pain, anorexia, chest pain, chills, congestion, coughing, diaphoresis, fatigue, fever, nausea, neck pain, rash, sore throat, swollen glands, urinary symptoms, vertigo, visual change, vomiting or weakness.   Objective   Vital Signs:  /82   Pulse 95   Temp 98 °F (36.7 °C) (Temporal)   Resp 18   Ht 177.8 cm (70\")   Wt 125 kg (276 lb)   SpO2 (!) 89%   BMI 39.60 kg/m²   Estimated body mass index is 39.6 kg/m² as calculated from the following:    Height as of this encounter: 177.8 cm (70\").    Weight as of this encounter: 125 kg (276 lb).       Physical Exam  Vitals and nursing note reviewed.   Constitutional:       General: He is awake.      Appearance: Normal appearance.   HENT:      Head: Normocephalic.      Right Ear: External ear normal. Decreased hearing noted.      Left Ear: External ear normal. Decreased hearing noted.      Nose: Nose normal.      Mouth/Throat:      Lips: Pink.      Mouth: Mucous membranes are moist.   Eyes:      General: Lids are normal.      Conjunctiva/sclera: Conjunctivae " normal.      Pupils: Pupils are equal, round, and reactive to light.   Cardiovascular:      Rate and Rhythm: Normal rate and regular rhythm.      Heart sounds: Normal heart sounds.      Comments: Trace edema - BLE  Pulmonary:      Effort: Pulmonary effort is normal.      Breath sounds: Normal breath sounds.   Abdominal:      General: Abdomen is protuberant. Bowel sounds are normal.      Palpations: Abdomen is soft.      Tenderness: There is no abdominal tenderness.   Musculoskeletal:         General: Normal range of motion.      Right hand: Tenderness present.      Left hand: Tenderness present.      Cervical back: Normal range of motion and neck supple.      Lumbar back: Tenderness present.      Right lower leg: Edema present.      Left lower leg: Edema present.      Right foot: Tenderness present.      Left foot: Tenderness present.   Skin:     General: Skin is warm and dry.      Capillary Refill: Capillary refill takes less than 2 seconds.   Neurological:      Mental Status: He is alert and oriented to person, place, and time.      Cranial Nerves: Cranial nerves 2-12 are intact.      Sensory: Sensation is intact.      Motor: Weakness present.      Coordination: Coordination abnormal.      Gait: Gait abnormal.      Comments: Pt utilizes cane with ambulation   Psychiatric:         Attention and Perception: Attention and perception normal.         Mood and Affect: Affect normal. Mood is anxious.         Speech: Speech is rapid and pressured.         Behavior: Behavior normal. Behavior is cooperative.         Thought Content: Thought content normal.        Result Review :    The following data was reviewed by: KELSEY Webb on 03/08/2024:  CMP          9/26/2023    14:58 11/8/2023    14:34 3/8/2024    11:36   CMP   Glucose 120   142    BUN 23   20    Creatinine 1.49  1.60  1.46    EGFR 49.9   51.1    Sodium 139   140    Potassium 4.2   4.1    Chloride 98   98    Calcium 10.5   9.5    Total Protein 7.2       Total Protein 7.8   7.7    Albumin 4.9     3.9   4.5    Globulin 3.3      Globulin 2.9   3.2    Total Bilirubin 0.5   0.5    Alkaline Phosphatase 63   58    AST (SGOT) 25   25    ALT (SGPT) 30   39    Albumin/Globulin Ratio 1.7   1.4    BUN/Creatinine Ratio 15.4   13.7    Anion Gap 12.6   13.4      CBC w/diff          6/7/2023    09:22 9/8/2023    09:09 9/26/2023    14:58   CBC w/Diff   WBC 8.50  7.05  8.42    RBC 3.78  3.87  3.89    Hemoglobin 14.3  14.4  14.5    Hematocrit 39.8  41.5  41.0    .3  107.2  105.4    MCH 37.8  37.2  37.3    MCHC 35.9  34.7  35.4    RDW 13.2  13.6  13.4    Platelets 182  194  199    Neutrophil Rel %  56.8  66.1    Immature Granulocyte Rel %  0.1     Lymphocyte Rel %  26.5  21.1    Monocyte Rel %  12.3  9.4    Eosinophil Rel %  3.7  2.5    Basophil Rel %  0.6  0.4      Lipid Panel          6/7/2023    09:22 9/8/2023    09:09 3/8/2024    11:36   Lipid Panel   Total Cholesterol 211  184  217    Triglycerides 380  423  383    HDL Cholesterol 31  28  33    VLDL Cholesterol 66  69  67    LDL Cholesterol  114  87  117    LDL/HDL Ratio 3.35  2.55  3.25      TSH          9/8/2023    09:09 9/26/2023    14:58 3/8/2024    11:36   TSH   TSH 1.100  1.070  1.130        Assessment and Plan     Diagnoses and all orders for this visit:    1. Essential hypertension (Primary)  Assessment & Plan:  Hypertension is stable and controlled  Continue current treatment regimen.  Blood pressure will be reassessed in 6 months.    Orders:  -     CBC & Differential  -     Comprehensive Metabolic Panel  -     Lipid panel  -     TSH    2. Hyperlipidemia, unspecified hyperlipidemia type  -     Lipid panel    3. Hyperglycemia  -     Comprehensive Metabolic Panel  -     MicroAlbumin, Urine, Random - Urine, Clean Catch    4. Fatigue, unspecified type  -     CBC & Differential  -     TSH  -     Vitamin B12    5. Vitamin D deficiency  -     Vitamin D,25-Hydroxy    6. Chronic gout without tophus, unspecified cause,  unspecified site  Comments:  continue current plan of care; will refill medications when indicated           I spent 30 minutes caring for Fer on this date of service. This time includes time spent by me in the following activities:preparing for the visit, reviewing tests, obtaining and/or reviewing a separately obtained history, performing a medically appropriate examination and/or evaluation , counseling and educating the patient/family/caregiver, ordering medications, tests, or procedures, referring and communicating with other health care professionals , documenting information in the medical record, independently interpreting results and communicating that information with the patient/family/caregiver, and care coordination    Follow Up     Return in about 6 months (around 9/8/2024).  Patient was given instructions and counseling regarding his condition or for health maintenance advice. Please see specific information pulled into the AVS if appropriate.         This document has been electronically signed by KELSEY Webb  March 9, 2024 14:41 EST

## 2024-03-09 LAB
25(OH)D3 SERPL-MCNC: 54.7 NG/ML (ref 30–100)
ALBUMIN UR-MCNC: 3.4 MG/DL
VIT B12 BLD-MCNC: >2000 PG/ML (ref 211–946)

## 2024-03-14 DIAGNOSIS — M1A.9XX0 CHRONIC GOUT WITHOUT TOPHUS, UNSPECIFIED CAUSE, UNSPECIFIED SITE: ICD-10-CM

## 2024-03-15 RX ORDER — ALLOPURINOL 300 MG/1
300 TABLET ORAL EVERY MORNING
Qty: 90 TABLET | Refills: 0 | Status: SHIPPED | OUTPATIENT
Start: 2024-03-15

## 2024-03-20 DIAGNOSIS — E78.5 HYPERLIPIDEMIA, UNSPECIFIED HYPERLIPIDEMIA TYPE: ICD-10-CM

## 2024-03-21 DIAGNOSIS — E78.5 HYPERLIPIDEMIA, UNSPECIFIED HYPERLIPIDEMIA TYPE: ICD-10-CM

## 2024-03-21 RX ORDER — FENOFIBRATE 48 MG/1
TABLET, COATED ORAL
Qty: 90 TABLET | Refills: 0 | OUTPATIENT
Start: 2024-03-21

## 2024-03-21 RX ORDER — FENOFIBRATE 145 MG/1
145 TABLET, COATED ORAL DAILY
Qty: 90 TABLET | Refills: 1 | Status: SHIPPED | OUTPATIENT
Start: 2024-03-21

## 2024-04-09 ENCOUNTER — OFFICE VISIT (OUTPATIENT)
Dept: FAMILY MEDICINE CLINIC | Facility: CLINIC | Age: 74
End: 2024-04-09
Payer: MEDICARE

## 2024-04-09 VITALS
WEIGHT: 278 LBS | HEIGHT: 70 IN | SYSTOLIC BLOOD PRESSURE: 120 MMHG | BODY MASS INDEX: 39.8 KG/M2 | DIASTOLIC BLOOD PRESSURE: 80 MMHG | HEART RATE: 81 BPM | OXYGEN SATURATION: 95 %

## 2024-04-09 DIAGNOSIS — R73.9 ELEVATED BLOOD SUGAR: ICD-10-CM

## 2024-04-09 DIAGNOSIS — E11.65 TYPE 2 DIABETES MELLITUS WITH HYPERGLYCEMIA, WITHOUT LONG-TERM CURRENT USE OF INSULIN: ICD-10-CM

## 2024-04-09 DIAGNOSIS — L02.811 ABSCESS OF SCALP: Primary | ICD-10-CM

## 2024-04-09 LAB
EXPIRATION DATE: ABNORMAL
HBA1C MFR BLD: 6.9 % (ref 4.5–5.7)
Lab: ABNORMAL

## 2024-04-09 PROCEDURE — 99214 OFFICE O/P EST MOD 30 MIN: CPT | Performed by: FAMILY MEDICINE

## 2024-04-09 PROCEDURE — 3044F HG A1C LEVEL LT 7.0%: CPT | Performed by: FAMILY MEDICINE

## 2024-04-09 PROCEDURE — 1159F MED LIST DOCD IN RCRD: CPT | Performed by: FAMILY MEDICINE

## 2024-04-09 PROCEDURE — 96372 THER/PROPH/DIAG INJ SC/IM: CPT | Performed by: FAMILY MEDICINE

## 2024-04-09 PROCEDURE — 1160F RVW MEDS BY RX/DR IN RCRD: CPT | Performed by: FAMILY MEDICINE

## 2024-04-09 PROCEDURE — 83036 HEMOGLOBIN GLYCOSYLATED A1C: CPT | Performed by: FAMILY MEDICINE

## 2024-04-09 PROCEDURE — 3074F SYST BP LT 130 MM HG: CPT | Performed by: FAMILY MEDICINE

## 2024-04-09 PROCEDURE — 3079F DIAST BP 80-89 MM HG: CPT | Performed by: FAMILY MEDICINE

## 2024-04-09 RX ORDER — KETOROLAC TROMETHAMINE 10 MG/1
10 TABLET, FILM COATED ORAL EVERY 6 HOURS PRN
Qty: 20 TABLET | Refills: 0 | Status: SHIPPED | OUTPATIENT
Start: 2024-04-09 | End: 2024-04-14

## 2024-04-09 RX ORDER — ATORVASTATIN CALCIUM 40 MG/1
1 TABLET, FILM COATED ORAL DAILY
COMMUNITY
Start: 2024-03-24

## 2024-04-09 RX ORDER — SULFAMETHOXAZOLE AND TRIMETHOPRIM 800; 160 MG/1; MG/1
1 TABLET ORAL 2 TIMES DAILY
Qty: 20 TABLET | Refills: 0 | Status: SHIPPED | OUTPATIENT
Start: 2024-04-09 | End: 2024-04-19

## 2024-04-09 RX ORDER — KETOROLAC TROMETHAMINE 30 MG/ML
60 INJECTION, SOLUTION INTRAMUSCULAR; INTRAVENOUS ONCE
Status: COMPLETED | OUTPATIENT
Start: 2024-04-09 | End: 2024-04-09

## 2024-04-09 RX ADMIN — KETOROLAC TROMETHAMINE 60 MG: 30 INJECTION, SOLUTION INTRAMUSCULAR; INTRAVENOUS at 13:45

## 2024-04-09 NOTE — PATIENT INSTRUCTIONS
Diabetes Mellitus and Nutrition, Adult  When you have diabetes, or diabetes mellitus, it is very important to have healthy eating habits because your blood sugar (glucose) levels are greatly affected by what you eat and drink. Eating healthy foods in the right amounts, at about the same times every day, can help you:  Manage your blood glucose.  Lower your risk of heart disease.  Improve your blood pressure.  Reach or maintain a healthy weight.  What can affect my meal plan?  Every person with diabetes is different, and each person has different needs for a meal plan. Your health care provider may recommend that you work with a dietitian to make a meal plan that is best for you. Your meal plan may vary depending on factors such as:  The calories you need.  The medicines you take.  Your weight.  Your blood glucose, blood pressure, and cholesterol levels.  Your activity level.  Other health conditions you have, such as heart or kidney disease.  How do carbohydrates affect me?  Carbohydrates, also called carbs, affect your blood glucose level more than any other type of food. Eating carbs raises the amount of glucose in your blood.  It is important to know how many carbs you can safely have in each meal. This is different for every person. Your dietitian can help you calculate how many carbs you should have at each meal and for each snack.  How does alcohol affect me?  Alcohol can cause a decrease in blood glucose (hypoglycemia), especially if you use insulin or take certain diabetes medicines by mouth. Hypoglycemia can be a life-threatening condition. Symptoms of hypoglycemia, such as sleepiness, dizziness, and confusion, are similar to symptoms of having too much alcohol.  Do not drink alcohol if:  Your health care provider tells you not to drink.  You are pregnant, may be pregnant, or are planning to become pregnant.  If you drink alcohol:  Limit how much you have to:  0-1 drink a day for women.  0-2 drinks a day  "for men.  Know how much alcohol is in your drink. In the [place] one drink equals one 12 oz bottle of beer (355 mL), one 5 oz glass of wine (148 mL), or one 1½ oz glass of hard liquor (44 mL).  Keep yourself hydrated with water, diet soda, or unsweetened iced tea. Keep in mind that regular soda, juice, and other mixers may contain a lot of sugar and must be counted as carbs.  What are tips for following this plan?    Reading food labels  Start by checking the serving size on the Nutrition Facts label of packaged foods and drinks. The number of calories and the amount of carbs, fats, and other nutrients listed on the label are based on one serving of the item. Many items contain more than one serving per package.  Check the total grams (g) of carbs in one serving.  Check the number of grams of saturated fats and trans fats in one serving. Choose foods that have a low amount or none of these fats.  Check the number of milligrams (mg) of salt (sodium) in one serving. Most people should limit total sodium intake to less than 2,300 mg per day.  Always check the nutrition information of foods labeled as \"low-fat\" or \"nonfat.\" These foods may be higher in added sugar or refined carbs and should be avoided.  Talk to your dietitian to identify your daily goals for nutrients listed on the label.  Shopping  Avoid buying canned, pre-made, or processed foods. These foods tend to be high in fat, sodium, and added sugar.  Shop around the outside edge of the grocery store. This is where you will most often find fresh fruits and vegetables, bulk grains, fresh meats, and fresh dairy products.  Cooking  Use low-heat cooking methods, such as baking, instead of high-heat cooking methods, such as deep frying.  Cook using healthy oils, such as olive, canola, or sunflower oil.  Avoid cooking with butter, cream, or high-fat meats.  Meal planning  Eat meals and snacks regularly, preferably at the same times every day. Avoid going long periods " of time without eating.  Eat foods that are high in fiber, such as fresh fruits, vegetables, beans, and whole grains.  Eat 4-6 oz (112-168 g) of lean protein each day, such as lean meat, chicken, fish, eggs, or tofu. One ounce (oz) (28 g) of lean protein is equal to:  1 oz (28 g) of meat, chicken, or fish.  1 egg.  ¼ cup (62 g) of tofu.  Eat some foods each day that contain healthy fats, such as avocado, nuts, seeds, and fish.  What foods should I eat?  Fruits  Berries. Apples. Oranges. Peaches. Apricots. Plums. Grapes. Mangoes. Papayas. Pomegranates. Kiwi. Cherries.  Vegetables  Leafy greens, including lettuce, spinach, kale, chard, hansel greens, mustard greens, and cabbage. Beets. Cauliflower. Broccoli. Carrots. Green beans. Tomatoes. Peppers. Onions. Cucumbers. [place] sprouts.  Grains  Whole grains, such as whole-wheat or whole-grain bread, crackers, tortillas, cereal, and pasta. Unsweetened oatmeal. Quinoa. Brown or wild rice.  Meats and other proteins  Seafood. Poultry without skin. Lean cuts of poultry and beef. Tofu. Nuts. Seeds.  Dairy  Low-fat or fat-free dairy products such as milk, yogurt, and cheese.  The items listed above may not be a complete list of foods and beverages you can eat and drink. Contact a dietitian for more information.  What foods should I avoid?  Fruits  Fruits canned with syrup.  Vegetables  Canned vegetables. Frozen vegetables with butter or cream sauce.  Grains  Refined white flour and flour products such as bread, pasta, snack foods, and cereals. Avoid all processed foods.  Meats and other proteins  Fatty cuts of meat. Poultry with skin. Breaded or fried meats. Processed meat. Avoid saturated fats.  Dairy  Full-fat yogurt, cheese, or milk.  Beverages  Sweetened drinks, such as soda or iced tea.  The items listed above may not be a complete list of foods and beverages you should avoid. Contact a dietitian for more information.  Questions to ask a health care provider  Do I need  to meet with a certified diabetes care and ?  Do I need to meet with a dietitian?  What number can I call if I have questions?  When are the best times to check my blood glucose?  Where to find more information:  American Diabetes Association: diabetes.org  Academy of Nutrition and Dietetics: eatright.org  National [place] of Diabetes and Digestive and Kidney Diseases: niddk.nih.gov  Association of Diabetes Care & Education Specialists: diabeteseducator.org  Summary  It is important to have healthy eating habits because your blood sugar (glucose) levels are greatly affected by what you eat and drink. It is important to use alcohol carefully.  A healthy meal plan will help you manage your blood glucose and lower your risk of heart disease.  Your health care provider may recommend that you work with a dietitian to make a meal plan that is best for you.  This information is not intended to replace advice given to you by your health care provider. Make sure you discuss any questions you have with your health care provider.  Document Revised: 07/21/2021 Document Reviewed: 07/21/2021  Elsevier Patient Education © 2023 Elsevier Inc.

## 2024-04-09 NOTE — PROGRESS NOTES
"Chief Complaint  Mass (Left side of head behind ear - VERY sensitive and sending shooting pains has been going on for about 1 week )    Subjective        Fer Ch presents to [facility]  History of Present Illness  The patient is a 73 y.o. male who is here today because of a painful area on the back of his head for 1 week. He states that he always have this sore and scaly areas on his head but this time it got swollen and painful      Objective   Vital Signs:  /80   Pulse 81   Ht 177.8 cm (70\")   Wt 126 kg (278 lb)   SpO2 95%   BMI 39.89 kg/m²   Estimated body mass index is 39.89 kg/m² as calculated from the following:    Height as of this encounter: 177.8 cm (70\").    Weight as of this encounter: 126 kg (278 lb).               Physical Exam  Vitals and nursing note reviewed.   Constitutional:       General: He is not in acute distress.     Appearance: Normal appearance. He is well-developed and well-groomed.   HENT:      Head: Normocephalic and atraumatic.      Jaw: No tenderness or pain on movement.      Salivary Glands: Right salivary gland is not diffusely enlarged. Left salivary gland is not diffusely enlarged.      Right Ear: Tympanic membrane and ear canal normal.      Left Ear: Tympanic membrane and ear canal normal.      Nose: No congestion or rhinorrhea.      Mouth/Throat:      Mouth: Mucous membranes are moist.      Pharynx: No oropharyngeal exudate or posterior oropharyngeal erythema.   Eyes:      General: Lids are normal. No allergic shiner or scleral icterus.     Conjunctiva/sclera: Conjunctivae normal.      Pupils: Pupils are equal, round, and reactive to light.   Neck:      Thyroid: No thyroid mass, thyromegaly or thyroid tenderness.      Trachea: Trachea normal.   Cardiovascular:      Rate and Rhythm: Normal rate and regular rhythm. No extrasystoles are present.     Pulses: Normal pulses.      Heart sounds: Normal heart sounds. No murmur " heard.  Pulmonary:      Effort: Pulmonary effort is normal. No respiratory distress.      Breath sounds: Normal breath sounds. No decreased breath sounds, wheezing, rhonchi or rales.   Chest:   Breasts:     Right: Normal.      Left: Normal.   Abdominal:      General: Bowel sounds are normal.      Palpations: Abdomen is soft.      Tenderness: There is no abdominal tenderness. There is no right CVA tenderness, left CVA tenderness, guarding or rebound.   Musculoskeletal:      Cervical back: Neck supple.      Right lower leg: No edema.      Left lower leg: No edema.   Lymphadenopathy:      Cervical: No cervical adenopathy.      Upper Body:      Right upper body: No supraclavicular or axillary adenopathy.      Left upper body: No supraclavicular or axillary adenopathy.   Skin:     General: Skin is warm.      Findings: Abscess (on the back of the head, left side, with swelling, erythema and tenderness) present.      Nails: There is no clubbing.   Neurological:      General: No focal deficit present.      Mental Status: He is alert and oriented to person, place, and time.      Sensory: Sensation is intact.      Motor: Motor function is intact.      Coordination: Coordination is intact.   Psychiatric:         Attention and Perception: Attention and perception normal.         Mood and Affect: Mood normal.         Speech: Speech normal.         Behavior: Behavior normal. Behavior is cooperative.         Thought Content: Thought content normal.        Result Review :                     Assessment and Plan     Diagnoses and all orders for this visit:    1. Abscess of scalp (Primary)  -     ketorolac (TORADOL) injection 60 mg    2. Elevated blood sugar  -     POC Glycosylated Hemoglobin (Hb A1C)    3. Type 2 diabetes mellitus with hyperglycemia, without long-term current use of insulin  Assessment & Plan:  Diabetes is newly identified.   Recommended an ADA diet.  Will start him in Jardiance, patient does not want to take  Meformin  Diabetes will be reassessed in 3 months    Orders:  -     empagliflozin (Jardiance) 10 MG tablet tablet; Take 1 tablet by mouth Daily for 30 days.  Dispense: 30 tablet; Refill: 2    Other orders  -     sulfamethoxazole-trimethoprim (Bactrim DS) 800-160 MG per tablet; Take 1 tablet by mouth 2 (Two) Times a Day for 10 days.  Dispense: 20 tablet; Refill: 0  -     ketorolac (TORADOL) 10 MG tablet; Take 1 tablet by mouth Every 6 (Six) Hours As Needed for Moderate Pain for up to 5 days.  Dispense: 20 tablet; Refill: 0           I spent 15 minutes caring for Fer on this date of service. This time includes time spent by me in the following activities:preparing for the visit, performing a medically appropriate examination and/or evaluation , counseling and educating the patient/family/caregiver, ordering medications, tests, or procedures, and documenting information in the medical record  Follow Up     Return in about 3 months (around 7/9/2024).  Patient was given instructions and counseling regarding his condition or for health maintenance advice. Please see specific information pulled into the AVS if appropriate.     The patient is advised to continue all of his regular medications as prescribed. He was counseled regarding the importance of diet, exercise and medication compliance.    RTC as needed if symptoms worsen or fail to improve.      This document has been electronically signed by Harry Virk MD  April 9, 2024 23:54 EDT

## 2024-04-10 ENCOUNTER — TELEPHONE (OUTPATIENT)
Dept: FAMILY MEDICINE CLINIC | Facility: CLINIC | Age: 74
End: 2024-04-10

## 2024-04-10 NOTE — ASSESSMENT & PLAN NOTE
Diabetes is newly identified.   Recommended an ADA diet.  Will start him in Jardiance, patient does not want to take Meformin  Diabetes will be reassessed in 3 months

## 2024-05-03 DIAGNOSIS — M1A.9XX0 CHRONIC GOUT WITHOUT TOPHUS, UNSPECIFIED CAUSE, UNSPECIFIED SITE: ICD-10-CM

## 2024-05-03 RX ORDER — ALLOPURINOL 100 MG/1
100 TABLET ORAL EVERY EVENING
Qty: 30 TABLET | Refills: 2 | Status: SHIPPED | OUTPATIENT
Start: 2024-05-03

## 2024-05-03 NOTE — TELEPHONE ENCOUNTER
Incoming Refill Request      Medication requested (name and dose):   allopurinol (ZYLOPRIM) 100 MG tablet 100 mg, Oral, Every Evening       Pharmacy where request should be sent: [facility]    Additional details provided by patient:     Best call back number: [phone]    Does the patient have less than a 3 day supply:  [x] Yes  [] No    Sanjuanita Govea Rep  05/03/24, 09:41 EDT

## 2024-05-03 NOTE — TELEPHONE ENCOUNTER
Rx Refill Note  Requested Prescriptions     Pending Prescriptions Disp Refills    allopurinol (ZYLOPRIM) 100 MG tablet 30 tablet 2     Sig: Take 1 tablet by mouth Every Evening.      Last office visit with prescribing clinician: 4/9/2024   Last telemedicine visit with prescribing clinician: Visit date not found   Next office visit with prescribing clinician: 9/9/2024                         Would you like a call back once the refill request has been completed: [] Yes [] No    If the office needs to give you a call back, can they leave a voicemail: [] Yes [] No    Renetta John RN  05/03/24, 10:02 EDT

## 2024-06-14 ENCOUNTER — TELEPHONE (OUTPATIENT)
Dept: FAMILY MEDICINE CLINIC | Facility: CLINIC | Age: 74
End: 2024-06-14
Payer: MEDICARE

## 2024-06-24 RX ORDER — CARVEDILOL 6.25 MG/1
6.25 TABLET ORAL 2 TIMES DAILY WITH MEALS
Qty: 180 TABLET | Refills: 0 | Status: SHIPPED | OUTPATIENT
Start: 2024-06-24

## 2024-07-09 ENCOUNTER — OFFICE VISIT (OUTPATIENT)
Dept: CARDIOLOGY | Facility: CLINIC | Age: 74
End: 2024-07-09
Payer: MEDICARE

## 2024-07-09 VITALS
HEIGHT: 70 IN | WEIGHT: 268 LBS | SYSTOLIC BLOOD PRESSURE: 134 MMHG | DIASTOLIC BLOOD PRESSURE: 99 MMHG | BODY MASS INDEX: 38.37 KG/M2 | HEART RATE: 100 BPM

## 2024-07-09 DIAGNOSIS — E78.5 DYSLIPIDEMIA: ICD-10-CM

## 2024-07-09 DIAGNOSIS — I25.10 CORONARY ARTERY DISEASE INVOLVING NATIVE CORONARY ARTERY OF NATIVE HEART WITHOUT ANGINA PECTORIS: Primary | ICD-10-CM

## 2024-07-09 DIAGNOSIS — I10 PRIMARY HYPERTENSION: ICD-10-CM

## 2024-07-09 PROCEDURE — 3075F SYST BP GE 130 - 139MM HG: CPT | Performed by: PHYSICIAN ASSISTANT

## 2024-07-09 PROCEDURE — 3080F DIAST BP >= 90 MM HG: CPT | Performed by: PHYSICIAN ASSISTANT

## 2024-07-09 PROCEDURE — 99214 OFFICE O/P EST MOD 30 MIN: CPT | Performed by: PHYSICIAN ASSISTANT

## 2024-07-09 PROCEDURE — 93000 ELECTROCARDIOGRAM COMPLETE: CPT | Performed by: PHYSICIAN ASSISTANT

## 2024-07-09 RX ORDER — ATORVASTATIN CALCIUM 40 MG/1
40 TABLET, FILM COATED ORAL DAILY
Qty: 90 TABLET | Refills: 3 | Status: SHIPPED | OUTPATIENT
Start: 2024-07-09

## 2024-07-09 NOTE — PROGRESS NOTES
Problem list     Subjective   Fer Ch is a 74 y.o. male     Chief Complaint   Patient presents with    6 month follow up     CAD     Problem list  1.  Coronary artery disease  1.1  History of stenting x3 in [place], inadequate data  1.2 Stress test June 2023 with no evidence of ischemia preserved LV function  2.  Preserved systolic function  3.  Hypertension  4.  Dyslipidemia     HPI    Patient is a 74-year-old male that presents back to the office for routine follow-up.  Patient has done well since his last visit without complaints of chest pain or pressure.  He has significant dyspnea when exerting or climbing a hill.  This apparently has been a chronic issue.  He does not describe any progression.  No dyspnea on a flat surface.  No PND or orthopnea.    He does not palpitate nor does complain of dysrhythmic symptoms.  He is stable otherwise.      Current Outpatient Medications on File Prior to Visit   Medication Sig Dispense Refill    allopurinol (ZYLOPRIM) 100 MG tablet Take 1 tablet by mouth Every Evening. 30 tablet 2    allopurinol (ZYLOPRIM) 300 MG tablet TAKE 1 TABLET BY MOUTH EVERY DAY IN THE MORNING 90 tablet 0    ASPIRIN 81 PO Take 1 tablet by mouth Daily.      carvedilol (COREG) 6.25 MG tablet TAKE 1 TABLET BY MOUTH TWICE A DAY WITH MEALS 180 tablet 0    fenofibrate (Tricor) 145 MG tablet Take 1 tablet by mouth Daily. 90 tablet 1    magnesium oxide (MAG-OX) 400 MG tablet Take 1 tablet by mouth Daily.      multivitamin (THERAGRAN) tablet tablet Take  by mouth Daily.      Quercetin 500 MG capsule Take 355 mg by mouth Daily.      tamsulosin (FLOMAX) 0.4 MG capsule 24 hr capsule Take 1 capsule by mouth Daily.      torsemide (DEMADEX) 100 MG tablet Take 1 tablet by mouth Daily.      TRAZODONE HCL PO Take  by mouth As Needed.      Turmeric-Ginger 150-25 MG chewable tablet Chew 2,360 mg Daily. Turmeric, Bioerine, Garlic, ginger.      vitamin C (ASCORBIC ACID) 250 MG tablet Take 2 tablets by mouth  Daily.      vitamin D3 125 MCG (5000 UT) capsule capsule Take 1 capsule by mouth Daily.      Zinc 50 MG tablet Take 1 tablet by mouth Daily.      [DISCONTINUED] atorvastatin (LIPITOR) 40 MG tablet Take 1 tablet by mouth Daily. (Patient not taking: Reported on 2024)      [DISCONTINUED] vitamin B-12 (CYANOCOBALAMIN) 1000 MCG tablet Take 2.5 tablets by mouth Daily. Holding right now       No current facility-administered medications on file prior to visit.       Atorvastatin and Potassium-containing compounds    Past Medical History:   Diagnosis Date    Arthritis     Asthma     Bladder cancer     Finished tx in 2022    CAD (coronary artery disease)     Diastolic congestive heart failure     Difficulty walking     Dyspnea     Gout     HL (hearing loss)     Hypertension     Idiopathic peripheral neuropathy 2023    Knee pain     Low back pain     Lymphedema     Peripheral neuropathy     Type 2 diabetes mellitus with hyperglycemia, without long-term current use of insulin 2024       Social History     Socioeconomic History    Marital status:    Tobacco Use    Smoking status: Former     Current packs/day: 0.00     Average packs/day: 1 pack/day for 50.0 years (50.0 ttl pk-yrs)     Types: Cigarettes     Start date: 1968     Quit date: 2018     Years since quittin.2     Passive exposure: Past    Smokeless tobacco: Never   Substance and Sexual Activity    Alcohol use: Yes     Alcohol/week: 3.0 standard drinks of alcohol     Types: 1 Cans of beer, 2 Shots of liquor per week     Comment: Some weeks none    Drug use: Never    Sexual activity: Not Currently     Partners: Female       Family History   Problem Relation Age of Onset    Cancer Father     Alcohol abuse Brother         Alcoholic       Review of Systems   Constitutional: Negative.  Negative for activity change, appetite change, chills and fatigue.   HENT: Negative.  Negative for congestion, sinus pressure and sinus pain.   "  Eyes: Negative.  Negative for visual disturbance.   Respiratory:  Positive for shortness of breath. Negative for apnea, cough, chest tightness and wheezing.    Cardiovascular: Negative.  Negative for chest pain, palpitations and leg swelling.   Gastrointestinal: Negative.  Negative for blood in stool.   Endocrine: Negative.  Negative for cold intolerance and heat intolerance.   Genitourinary:  Negative for hematuria.   Musculoskeletal: Negative.  Negative for gait problem.   Skin: Negative.  Negative for color change and wound.   Neurological: Negative.  Negative for dizziness, syncope, weakness, light-headedness, numbness and headaches.   Hematological:  Bruises/bleeds easily.   Psychiatric/Behavioral: Negative.  Negative for sleep disturbance.        Objective   Vitals:    07/09/24 1434   BP: 134/99   BP Location: Right arm   Patient Position: Sitting   Cuff Size: Adult   Pulse: 100   Weight: 122 kg (268 lb)   Height: 177.8 cm (70\")      /99 (BP Location: Right arm, Patient Position: Sitting, Cuff Size: Adult)   Pulse 100   Ht 177.8 cm (70\")   Wt 122 kg (268 lb)   BMI 38.45 kg/m²     Lab Results (most recent)       None            Physical Exam  Vitals and nursing note reviewed.   Constitutional:       General: He is not in acute distress.     Appearance: Normal appearance. He is well-developed.   HENT:      Head: Normocephalic and atraumatic.   Eyes:      General: No scleral icterus.        Right eye: No discharge.         Left eye: No discharge.      Conjunctiva/sclera: Conjunctivae normal.   Neck:      Vascular: No carotid bruit.   Cardiovascular:      Rate and Rhythm: Normal rate and regular rhythm.      Heart sounds: Normal heart sounds. No murmur heard.     No friction rub. No gallop.   Pulmonary:      Effort: Pulmonary effort is normal. No respiratory distress.      Breath sounds: Normal breath sounds. No wheezing or rales.   Chest:      Chest wall: No tenderness.   Musculoskeletal:      Right " lower leg: No edema.      Left lower leg: No edema.   Skin:     General: Skin is warm and dry.      Coloration: Skin is not pale.      Findings: No erythema or rash.   Neurological:      Mental Status: He is alert and oriented to person, place, and time.      Cranial Nerves: No cranial nerve deficit.   Psychiatric:         Behavior: Behavior normal.         Procedure     ECG 12 Lead    Date/Time: 7/9/2024 2:36 PM  Performed by: Porfirio Salgado PA    Authorized by: Porfirio Salgado PA  Comparison: not compared with previous ECG   Previous ECG: no previous ECG available  Comments: EKG demonstrates sinus tachycardia 102 bpm, right bundle branch block with no acute ST changes             Assessment & Plan     Problems Addressed this Visit          Cardiac and Vasculature    Primary hypertension    Dyslipidemia    Coronary artery disease involving native coronary artery of native heart without angina pectoris - Primary     Diagnoses         Codes Comments    Coronary artery disease involving native coronary artery of native heart without angina pectoris    -  Primary ICD-10-CM: I25.10  ICD-9-CM: 414.01     Primary hypertension     ICD-10-CM: I10  ICD-9-CM: 401.9     Dyslipidemia     ICD-10-CM: E78.5  ICD-9-CM: 272.4           Recommendations  1.  Patient is a 74-year-old male presenting back to the office for follow-up with known coronary disease for remarkably well.  He has no angina.  He has a degree of dyspnea that is stable.  He had recent testing last year which was negative in regards to ischemia.  LV function is normal.    2.  Patient with baseline hypertension doing well on medical therapy.  I will make no changes.    3.  I do recommend patient being on statin therapy due to dyslipidemia.  Patient has known coronary disease.    4.  Otherwise, he feels stable.  Will continue the same and see patient back for follow-up in 6 months to year or sooner if needed.  Follow-up with primary as scheduled.            Patient did not bring med list or medicine bottles to appointment, med list has been reviewed and updated based on patient's knowledge of their meds.        Advance Care Planning   ACP discussion was declined by the patient. Patient does not have an advance directive, declines further assistance.        Electronically signed by:

## 2024-07-29 DIAGNOSIS — M1A.9XX0 CHRONIC GOUT WITHOUT TOPHUS, UNSPECIFIED CAUSE, UNSPECIFIED SITE: ICD-10-CM

## 2024-07-29 RX ORDER — ALLOPURINOL 300 MG/1
300 TABLET ORAL EVERY MORNING
Qty: 90 TABLET | Refills: 0 | Status: SHIPPED | OUTPATIENT
Start: 2024-07-29

## 2024-07-29 NOTE — TELEPHONE ENCOUNTER
Rx Refill Note  Requested Prescriptions     Pending Prescriptions Disp Refills    allopurinol (ZYLOPRIM) 300 MG tablet 90 tablet 0     Sig: Take 1 tablet by mouth Every Morning.      Last office visit with prescribing clinician: Visit date not found   Last telemedicine visit with prescribing clinician: Visit date not found   Next office visit with prescribing clinician: Visit date not found                         Would you like a call back once the refill request has been completed: [] Yes [] No    If the office needs to give you a call back, can they leave a voicemail: [] Yes [] No    Mag Melissa CMA  07/29/24, 11:51 EDT

## 2024-08-27 ENCOUNTER — TELEPHONE (OUTPATIENT)
Dept: CARDIOLOGY | Facility: CLINIC | Age: 74
End: 2024-08-27
Payer: MEDICARE

## 2024-08-27 NOTE — TELEPHONE ENCOUNTER
----- Message from Porfirio Salgado sent at 8/27/2024 11:56 AM EDT -----  Regarding: FW: Torsemide  Contact: [phone]  Please refill and let that patient know  ----- Message -----  From: Brandi Burks  Sent: 8/27/2024  10:18 AM EDT  To: JOSE DE JESUS Elizondo  Subject: FW: Torsemide                                      ----- Message -----  From: Fer Ch  Sent: 8/26/2024   5:13 PM EDT  To: [facility]  Subject: Torsemide                                        Adonay has been on Torsemide for years. For some reason [facility] says the refill request has not been responded to. It doesn’t say on the bottle which doctor prescribed it but I know it originally was from his cardiologist in [place] when he was diagnosed with heart problems. Anyway he needs his Torsemide for fluid retention in feet and hands. Or should I contact his primary?  Thank you  Jamila Ch

## 2024-08-28 RX ORDER — TORSEMIDE 100 MG/1
100 TABLET ORAL DAILY
Qty: 90 TABLET | Refills: 3 | Status: SHIPPED | OUTPATIENT
Start: 2024-08-28

## 2024-08-28 NOTE — TELEPHONE ENCOUNTER
torsemide  (Newest Message First)  View All Conversations on this Encounter  Fer Ch  P Mge Card [facility] (supporting Lizzie Espinosa MA)11 hours ago (10:14 PM)       He takes 100mg of Torsemide. I don’t know what doctor has been refilling it for the last year. Doesn’t say on the bottle. It was his cardiologist in [place], then we moved and he went to [facility] for a 9 months. I do not even remember the cardiologist he had at .     Thank you.       LAUREN Alejandro17 hours ago (4:38 PM)     HORACIO Monroy is going to send in your torsemide, can you please confirm the dosing? Thank you.

## 2024-09-04 ENCOUNTER — TELEPHONE (OUTPATIENT)
Dept: CARDIOLOGY | Facility: CLINIC | Age: 74
End: 2024-09-04

## 2024-09-04 NOTE — TELEPHONE ENCOUNTER
Caller: TIFFANIE DE LUNA    Relationship: Emergency Contact    Best call back number: [phone]    Who is your current provider: PANCHITO BURKS    Is your current provider offboarding? NO    Who would you like your new provider to be: REINALDO HERRERA    What are your reasons for transferring care: NOT HAPPY WITH THE SERVICE FROM PANCHITO BURKS    Additional notes:

## 2024-09-04 NOTE — TELEPHONE ENCOUNTER
Caller: TIFFANIE DE LUNA    Relationship: Emergency Contact    Best call back number: [phone]    Who is your current provider: PANCHITO BURKS    Is your current provider offboarding? NO    Who would you like your new provider to be: REINALDO HERRERA    What are your reasons for transferring care: NOT HAPPY WITH THE CARE FROM PANCHITO BURKS    Additional notes:

## 2024-09-05 ENCOUNTER — OFFICE VISIT (OUTPATIENT)
Dept: CARDIOLOGY | Facility: CLINIC | Age: 74
End: 2024-09-05
Payer: MEDICARE

## 2024-09-05 VITALS
HEART RATE: 86 BPM | BODY MASS INDEX: 38.8 KG/M2 | WEIGHT: 271 LBS | OXYGEN SATURATION: 96 % | SYSTOLIC BLOOD PRESSURE: 152 MMHG | HEIGHT: 70 IN | DIASTOLIC BLOOD PRESSURE: 101 MMHG

## 2024-09-05 DIAGNOSIS — R06.02 SHORTNESS OF BREATH: ICD-10-CM

## 2024-09-05 DIAGNOSIS — I25.10 CORONARY ARTERY DISEASE INVOLVING NATIVE CORONARY ARTERY OF NATIVE HEART WITHOUT ANGINA PECTORIS: ICD-10-CM

## 2024-09-05 DIAGNOSIS — R60.0 BILATERAL LOWER EXTREMITY EDEMA: ICD-10-CM

## 2024-09-05 DIAGNOSIS — I73.9 CLAUDICATION: ICD-10-CM

## 2024-09-05 DIAGNOSIS — R07.89 ATYPICAL CHEST PAIN: ICD-10-CM

## 2024-09-05 DIAGNOSIS — I10 PRIMARY HYPERTENSION: Primary | ICD-10-CM

## 2024-09-05 DIAGNOSIS — E78.5 DYSLIPIDEMIA: ICD-10-CM

## 2024-09-05 PROCEDURE — 99214 OFFICE O/P EST MOD 30 MIN: CPT | Performed by: NURSE PRACTITIONER

## 2024-09-05 PROCEDURE — 1159F MED LIST DOCD IN RCRD: CPT | Performed by: NURSE PRACTITIONER

## 2024-09-05 PROCEDURE — 3077F SYST BP >= 140 MM HG: CPT | Performed by: NURSE PRACTITIONER

## 2024-09-05 PROCEDURE — 3080F DIAST BP >= 90 MM HG: CPT | Performed by: NURSE PRACTITIONER

## 2024-09-05 PROCEDURE — 1160F RVW MEDS BY RX/DR IN RCRD: CPT | Performed by: NURSE PRACTITIONER

## 2024-09-05 PROCEDURE — 93000 ELECTROCARDIOGRAM COMPLETE: CPT | Performed by: NURSE PRACTITIONER

## 2024-09-05 RX ORDER — CARVEDILOL 12.5 MG/1
12.5 TABLET ORAL 2 TIMES DAILY
Qty: 180 TABLET | Refills: 3 | Status: SHIPPED | OUTPATIENT
Start: 2024-09-05

## 2024-09-05 RX ORDER — METOLAZONE 5 MG/1
5 TABLET ORAL DAILY
Qty: 3 TABLET | Refills: 0 | Status: SHIPPED | OUTPATIENT
Start: 2024-09-05 | End: 2024-09-09 | Stop reason: SINTOL

## 2024-09-05 NOTE — PROGRESS NOTES
Subjective     Fer Ch is a 74 y.o. male who presents to day for 6 month follow up , Coronary Artery Disease, Edema (Skin is swelling then will split will drain fluid ), Shortness of Breath, and Chest Pain (NO Pain but tightness in chest ).    CHIEF COMPLIANT  Chief Complaint   Patient presents with    6 month follow up     Coronary Artery Disease    Edema     Skin is swelling then will split will drain fluid     Shortness of Breath    Chest Pain     NO Pain but tightness in chest        Active Problems:  Problem List Items Addressed This Visit          Cardiac and Vasculature    Primary hypertension - Primary    Relevant Medications    carvedilol (COREG) 12.5 MG tablet    Other Relevant Orders    ECG 12 Lead    Adult Transthoracic Echo Complete W/ Cont if Necessary Per Protocol    Stress Test With Myocardial Perfusion One Day    Dyslipidemia    Relevant Orders    ECG 12 Lead    Adult Transthoracic Echo Complete W/ Cont if Necessary Per Protocol    Stress Test With Myocardial Perfusion One Day    Coronary artery disease involving native coronary artery of native heart without angina pectoris    Relevant Medications    carvedilol (COREG) 12.5 MG tablet    Other Relevant Orders    ECG 12 Lead    Adult Transthoracic Echo Complete W/ Cont if Necessary Per Protocol    Stress Test With Myocardial Perfusion One Day       Pulmonary and Pneumonias    Shortness of breath    Relevant Orders    ECG 12 Lead    Adult Transthoracic Echo Complete W/ Cont if Necessary Per Protocol    Stress Test With Myocardial Perfusion One Day       Symptoms and Signs    Bilateral lower extremity edema    Relevant Orders    ECG 12 Lead    Adult Transthoracic Echo Complete W/ Cont if Necessary Per Protocol    Stress Test With Myocardial Perfusion One Day     Other Visit Diagnoses       Atypical chest pain        Relevant Orders    ECG 12 Lead    Adult Transthoracic Echo Complete W/ Cont if Necessary Per Protocol    Stress Test With  Myocardial Perfusion One Day    Claudication        Relevant Orders    ECG 12 Lead    Duplex Upper Extremity Art / Grafts - Bilateral CAR          Problem list  1.  Coronary artery disease  1.1  History of stenting x3 in [place] in 2015 RCA, inadequate data  1.2 Stress test June 2023 with no evidence of ischemia preserved LV function  2.  Preserved systolic function  2.1 echocardiogram 4/23: EF 55 to 60%, akinetic septal wall, normal left ventricular filling pressures  3.  Hypertension  4.  Dyslipidemia  HPI  HPI  Mr. Matthew Ch is a 74-year-old male patient who is being followed up today for chronic arterial hypertension and history of coronary artery disease.    Patient does have chronic arterial hypertension in which she is being treated with carvedilol.  Today's blood pressure is 152/101 with a heart rate of 86.    Patient also has a history of coronary artery disease in which she has had previous stenting x 3 in [place].  We have inadequate data to determine the extent of the stenting and residual blockage.  He is on atorvastatin for high intensity statin therapy as well as fenofibrate.  Aspirin 81 mg daily for antiplatelet therapy.    Patient does report that he has had an episode of diaphoresis.  He says he just gets hot and clammy for no reason.  No obvious triggers.    Patient does report shortness of breath that seems to be worsening.  Says it is worse with activity.  He says minimal activity causes him to become dyspneic.  However he does report some dyspnea that occurs at rest as well as some level of orthopnea.    Patient does report chest tightness that occurs with activity this occurs pretty much on a daily basis.  Its intermittent in nature.  He says activity does seem to make it worse with activity such as walking.  He does have associated shortness of breath.    Patient does report leg pain that if he stands still in 1 place it does seem to help.  He says it seems to be worse at night.   He says you can go 2 to 3 days in a row without sleeping due to the level of leg pain.  He has tried gabapentin which caused him to have double vision.  He is going to be seen by pain management.  He also reports that he has numbness above his knees bilaterally.  It occurs both on the left and right but is worst on the left.  He is seeing neurology as well as neurosurgery.    We did review his most recent labs that showed that his BUN was 24 and creatinine 1.55.  He is currently on torsemide for diuretic therapy.  PRIOR MEDS  Current Outpatient Medications on File Prior to Visit   Medication Sig Dispense Refill    ASPIRIN 81 PO Take 1 tablet by mouth Daily.      magnesium oxide (MAG-OX) 400 MG tablet Take 1 tablet by mouth Daily.      multivitamin (THERAGRAN) tablet tablet Take  by mouth Daily.      Quercetin 500 MG capsule Take 355 mg by mouth Daily.      tamsulosin (FLOMAX) 0.4 MG capsule 24 hr capsule Take 1 capsule by mouth Daily.      torsemide (DEMADEX) 100 MG tablet Take 1 tablet by mouth Daily. 90 tablet 3    TRAZODONE HCL PO Take  by mouth As Needed.      Turmeric-Ginger 150-25 MG chewable tablet Chew 2,360 mg Daily. Turmeric, Bioerine, Garlic, ginger.      vitamin C (ASCORBIC ACID) 250 MG tablet Take 2 tablets by mouth Daily.      vitamin D3 125 MCG (5000 UT) capsule capsule Take 1 capsule by mouth Daily.      Zinc 50 MG tablet Take 1 tablet by mouth Daily.       No current facility-administered medications on file prior to visit.       ALLERGIES  Atorvastatin and Potassium-containing compounds    HISTORY  Past Medical History:   Diagnosis Date    Arthritis     Asthma     Bladder cancer     Finished tx in 12/2022    CAD (coronary artery disease)     Diastolic congestive heart failure     Difficulty walking 2015    Dyspnea     Gout     HL (hearing loss) 2022    Hypertension     Idiopathic peripheral neuropathy 09/06/2023    Knee pain     Low back pain     Lymphedema     Obesity     Peripheral neuropathy      Type 2 diabetes mellitus with hyperglycemia, without long-term current use of insulin 2024       Social History     Socioeconomic History    Marital status:    Tobacco Use    Smoking status: Former     Current packs/day: 0.00     Average packs/day: 1 pack/day for 50.0 years (50.0 ttl pk-yrs)     Types: Cigarettes     Start date: 1968     Quit date: 2018     Years since quittin.4     Passive exposure: Past    Smokeless tobacco: Never   Vaping Use    Vaping status: Never Used   Substance and Sexual Activity    Alcohol use: Yes     Alcohol/week: 3.0 standard drinks of alcohol     Types: 1 Cans of beer, 2 Shots of liquor per week     Comment: Some weeks none    Drug use: Never    Sexual activity: Not Currently     Partners: Female       Family History   Problem Relation Age of Onset    Cancer Father     Alcohol abuse Brother         Alcoholic       Review of Systems   Constitutional:  Positive for fatigue. Negative for chills and fever.   HENT:  Positive for congestion (in the morning). Negative for rhinorrhea and sore throat.    Eyes:  Negative for visual disturbance.   Respiratory:  Positive for chest tightness and shortness of breath. Negative for apnea.    Cardiovascular:  Positive for leg swelling (from the knee down legs will split). Negative for chest pain and palpitations.   Gastrointestinal:  Negative for constipation, diarrhea and nausea.   Musculoskeletal:  Positive for arthralgias and neck pain. Negative for back pain.   Skin:  Positive for wound (has a would on Right leg). Negative for rash.   Allergic/Immunologic: Negative for environmental allergies and food allergies.   Neurological:  Positive for weakness (pain and weakness from the knee down). Negative for dizziness, syncope and light-headedness.   Hematological:  Bruises/bleeds easily.   Psychiatric/Behavioral:  Positive for sleep disturbance (does not sleep well due to pain  neuropathy).        Objective     VITALS:  "BP (!) 152/101 (BP Location: Right arm, Patient Position: Sitting, Cuff Size: Adult)   Pulse 86   Ht 177.8 cm (70\")   Wt 123 kg (271 lb)   SpO2 96%   BMI 38.88 kg/m²     LABS:   Lab Results (most recent)       None            IMAGING:   No Images in the past 120 days found..    EXAM:  Physical Exam  Vitals and nursing note reviewed.   Constitutional:       Appearance: He is well-developed.   HENT:      Head: Normocephalic.   Neck:      Thyroid: No thyroid mass.      Vascular: No carotid bruit or JVD.      Trachea: Trachea and phonation normal.   Cardiovascular:      Rate and Rhythm: Normal rate and regular rhythm.      Pulses:           Radial pulses are 2+ on the right side and 2+ on the left side.        Posterior tibial pulses are 2+ on the right side and 2+ on the left side.      Heart sounds: Normal heart sounds. No murmur heard.     No friction rub. No gallop.   Pulmonary:      Effort: Pulmonary effort is normal. No respiratory distress.      Breath sounds: Normal breath sounds. No wheezing or rales.   Musculoskeletal:         General: No swelling. Normal range of motion.      Cervical back: Neck supple.      Right lower leg: Edema (2+) present.      Left lower leg: Edema (2+) present.   Skin:     General: Skin is warm and dry.      Capillary Refill: Capillary refill takes less than 2 seconds.      Findings: No rash.   Neurological:      Mental Status: He is alert and oriented to person, place, and time.   Psychiatric:         Speech: Speech normal.         Behavior: Behavior normal.         Thought Content: Thought content normal.         Judgment: Judgment normal.         Procedure     ECG 12 Lead    Date/Time: 9/5/2024 2:26 PM  Performed by: Curtis Grissom APRN    Authorized by: Curtis Grissom APRN  Comparison: compared with previous ECG from 7/9/2024  Similar to previous ECG  Rhythm: sinus rhythm  Rate: normal  BPM: 89  Conduction: right bundle branch block  QRS axis: normal  Other findings: " non-specific ST-T wave changes  Comments: QTc 469 ms  No acute changes             Assessment & Plan    Diagnosis Plan   1. Primary hypertension  ECG 12 Lead    Adult Transthoracic Echo Complete W/ Cont if Necessary Per Protocol    Stress Test With Myocardial Perfusion One Day      2. Coronary artery disease involving native coronary artery of native heart without angina pectoris  ECG 12 Lead    Adult Transthoracic Echo Complete W/ Cont if Necessary Per Protocol    Stress Test With Myocardial Perfusion One Day      3. Dyslipidemia  ECG 12 Lead    Adult Transthoracic Echo Complete W/ Cont if Necessary Per Protocol    Stress Test With Myocardial Perfusion One Day      4. Shortness of breath  ECG 12 Lead    Adult Transthoracic Echo Complete W/ Cont if Necessary Per Protocol    Stress Test With Myocardial Perfusion One Day      5. Atypical chest pain  ECG 12 Lead    Adult Transthoracic Echo Complete W/ Cont if Necessary Per Protocol    Stress Test With Myocardial Perfusion One Day      6. Bilateral lower extremity edema  ECG 12 Lead    Adult Transthoracic Echo Complete W/ Cont if Necessary Per Protocol    Stress Test With Myocardial Perfusion One Day      7. Claudication  ECG 12 Lead    Duplex Upper Extremity Art / Grafts - Bilateral CAR      1.  Patient does have an elevated blood pressure today in which we will increase his carvedilol to 12.5 mg twice daily.  He will continue to monitor his blood pressure on a routine basis report any significant highs or lows.  Goal blood pressure 130 over 80s was discussed.    2.  Due to patient's claudication-like symptoms and persistent lower extremity pain I do think it is appropriate to have him go under a duplex of his bilateral lower extremities.    3.  Due to patient's bilateral lower extremity edema in which she is already on diuretic therapy of torsemide.  We will do metolazone 5 mg 30 minutes prior to the torsemide for 3 days to see if we can better control his  edema.    4.  Due to patient's chest tightness, shortness of breath and worsening failure-like symptoms I do think an ischemic workup including stress test and echocardiogram are appropriate.    5.  Informed of signs and symptoms of ACS and advised to seek emergent treatment for any new worsening symptoms.  Patient also advised sooner follow-up as needed.  Also advised to follow-up with family doctor as needed  This note is dictated utilizing voice recognition software.  Although this record has been proof read, transcriptional errors may still be present. If questions occur regarding the content of this record please do not hesitate to call our office.  I have reviewed and confirmed the accuracy of the ROS as documented by the MA/LPN/RN KELSEY Cedillo    No follow-ups on file.    Diagnoses and all orders for this visit:    1. Primary hypertension (Primary)  -     ECG 12 Lead  -     Discontinue: metOLazone (ZAROXOLYN) 5 MG tablet; Take 1 tablet by mouth Daily.  Dispense: 3 tablet; Refill: 0  -     Adult Transthoracic Echo Complete W/ Cont if Necessary Per Protocol; Future  -     Stress Test With Myocardial Perfusion One Day; Future    2. Coronary artery disease involving native coronary artery of native heart without angina pectoris  -     ECG 12 Lead  -     Discontinue: metOLazone (ZAROXOLYN) 5 MG tablet; Take 1 tablet by mouth Daily.  Dispense: 3 tablet; Refill: 0  -     Adult Transthoracic Echo Complete W/ Cont if Necessary Per Protocol; Future  -     Stress Test With Myocardial Perfusion One Day; Future    3. Dyslipidemia  -     ECG 12 Lead  -     Discontinue: metOLazone (ZAROXOLYN) 5 MG tablet; Take 1 tablet by mouth Daily.  Dispense: 3 tablet; Refill: 0  -     Adult Transthoracic Echo Complete W/ Cont if Necessary Per Protocol; Future  -     Stress Test With Myocardial Perfusion One Day; Future    4. Shortness of breath  -     ECG 12 Lead  -     Discontinue: metOLazone (ZAROXOLYN) 5 MG tablet; Take 1  tablet by mouth Daily.  Dispense: 3 tablet; Refill: 0  -     Adult Transthoracic Echo Complete W/ Cont if Necessary Per Protocol; Future  -     Stress Test With Myocardial Perfusion One Day; Future    5. Atypical chest pain  -     ECG 12 Lead  -     Discontinue: metOLazone (ZAROXOLYN) 5 MG tablet; Take 1 tablet by mouth Daily.  Dispense: 3 tablet; Refill: 0  -     Adult Transthoracic Echo Complete W/ Cont if Necessary Per Protocol; Future  -     Stress Test With Myocardial Perfusion One Day; Future    6. Bilateral lower extremity edema  -     ECG 12 Lead  -     Discontinue: metOLazone (ZAROXOLYN) 5 MG tablet; Take 1 tablet by mouth Daily.  Dispense: 3 tablet; Refill: 0  -     Adult Transthoracic Echo Complete W/ Cont if Necessary Per Protocol; Future  -     Stress Test With Myocardial Perfusion One Day; Future    7. Claudication  -     ECG 12 Lead  -     Duplex Upper Extremity Art / Grafts - Bilateral CAR; Future    Other orders  -     carvedilol (COREG) 12.5 MG tablet; Take 1 tablet by mouth 2 (Two) Times a Day.  Dispense: 180 tablet; Refill: 3        Fer Ch  reports that he quit smoking about 6 years ago. His smoking use included cigarettes. He started smoking about 56 years ago. He has a 50 pack-year smoking history. He has been exposed to tobacco smoke. He has never used smokeless tobacco. I have educated him on the risk of diseases from using tobacco products. Patient does not smoke.          Class 2 Severe Obesity (BMI >=35 and <=39.9). Obesity-related health conditions include the following: hypertension. We discussed portion control and increasing exercise.           MEDS ORDERED DURING VISIT:  New Medications Ordered This Visit   Medications    carvedilol (COREG) 12.5 MG tablet     Sig: Take 1 tablet by mouth 2 (Two) Times a Day.     Dispense:  180 tablet     Refill:  3           This document has been electronically signed by Curtis Grissom Jr., APRN  September 10, 2024 00:02 EDT     I have personally seen and examined this patient. I have fully participated in the care of this patient. I have reviewed all pertinent clinical information, including history physical exam, plan and the Resident's note and agree except as noted

## 2024-09-09 ENCOUNTER — OFFICE VISIT (OUTPATIENT)
Dept: FAMILY MEDICINE CLINIC | Facility: CLINIC | Age: 74
End: 2024-09-09
Payer: MEDICARE

## 2024-09-09 VITALS
SYSTOLIC BLOOD PRESSURE: 115 MMHG | WEIGHT: 262.8 LBS | RESPIRATION RATE: 18 BRPM | HEART RATE: 92 BPM | TEMPERATURE: 97.1 F | BODY MASS INDEX: 37.62 KG/M2 | OXYGEN SATURATION: 98 % | HEIGHT: 70 IN | DIASTOLIC BLOOD PRESSURE: 72 MMHG

## 2024-09-09 DIAGNOSIS — E11.65 TYPE 2 DIABETES MELLITUS WITH HYPERGLYCEMIA, WITHOUT LONG-TERM CURRENT USE OF INSULIN: ICD-10-CM

## 2024-09-09 DIAGNOSIS — M1A.9XX0 CHRONIC GOUT WITHOUT TOPHUS, UNSPECIFIED CAUSE, UNSPECIFIED SITE: ICD-10-CM

## 2024-09-09 DIAGNOSIS — I10 PRIMARY HYPERTENSION: ICD-10-CM

## 2024-09-09 DIAGNOSIS — Z12.11 ENCOUNTER FOR SCREENING COLONOSCOPY: ICD-10-CM

## 2024-09-09 DIAGNOSIS — Z00.00 MEDICARE ANNUAL WELLNESS VISIT, SUBSEQUENT: Primary | ICD-10-CM

## 2024-09-09 DIAGNOSIS — E66.01 CLASS 2 SEVERE OBESITY WITH SERIOUS COMORBIDITY AND BODY MASS INDEX (BMI) OF 37.0 TO 37.9 IN ADULT, UNSPECIFIED OBESITY TYPE: ICD-10-CM

## 2024-09-09 DIAGNOSIS — E66.812 CLASS 2 SEVERE OBESITY WITH SERIOUS COMORBIDITY AND BODY MASS INDEX (BMI) OF 37.0 TO 37.9 IN ADULT, UNSPECIFIED OBESITY TYPE: ICD-10-CM

## 2024-09-09 DIAGNOSIS — E78.5 HYPERLIPIDEMIA, UNSPECIFIED HYPERLIPIDEMIA TYPE: ICD-10-CM

## 2024-09-09 DIAGNOSIS — Z12.5 SCREENING FOR PROSTATE CANCER: ICD-10-CM

## 2024-09-09 DIAGNOSIS — I25.10 CORONARY ARTERY DISEASE INVOLVING NATIVE CORONARY ARTERY OF NATIVE HEART WITHOUT ANGINA PECTORIS: ICD-10-CM

## 2024-09-09 DIAGNOSIS — E78.5 DYSLIPIDEMIA: ICD-10-CM

## 2024-09-09 PROCEDURE — 3074F SYST BP LT 130 MM HG: CPT | Performed by: FAMILY MEDICINE

## 2024-09-09 PROCEDURE — 1160F RVW MEDS BY RX/DR IN RCRD: CPT | Performed by: FAMILY MEDICINE

## 2024-09-09 PROCEDURE — 1159F MED LIST DOCD IN RCRD: CPT | Performed by: FAMILY MEDICINE

## 2024-09-09 PROCEDURE — 3078F DIAST BP <80 MM HG: CPT | Performed by: FAMILY MEDICINE

## 2024-09-09 PROCEDURE — G0439 PPPS, SUBSEQ VISIT: HCPCS | Performed by: FAMILY MEDICINE

## 2024-09-09 PROCEDURE — 99214 OFFICE O/P EST MOD 30 MIN: CPT | Performed by: FAMILY MEDICINE

## 2024-09-09 PROCEDURE — 3044F HG A1C LEVEL LT 7.0%: CPT | Performed by: FAMILY MEDICINE

## 2024-09-09 RX ORDER — FENOFIBRATE 145 MG/1
145 TABLET, COATED ORAL DAILY
Qty: 90 TABLET | Refills: 3 | Status: SHIPPED | OUTPATIENT
Start: 2024-09-09 | End: 2024-12-08

## 2024-09-09 RX ORDER — ALLOPURINOL 100 MG/1
100 TABLET ORAL EVERY EVENING
Qty: 90 TABLET | Refills: 3 | Status: SHIPPED | OUTPATIENT
Start: 2024-09-09 | End: 2024-12-08

## 2024-09-09 RX ORDER — FUROSEMIDE 20 MG
20 TABLET ORAL 2 TIMES DAILY
COMMUNITY
End: 2024-10-03

## 2024-09-09 RX ORDER — ALLOPURINOL 300 MG/1
300 TABLET ORAL EVERY MORNING
Qty: 90 TABLET | Refills: 3 | Status: SHIPPED | OUTPATIENT
Start: 2024-09-09 | End: 2024-12-08

## 2024-09-09 NOTE — PROGRESS NOTES
Subjective   The ABCs of the Annual Wellness Visit  Medicare Wellness Visit      Fer Ch is a 74 y.o. patient who presents for a Medicare Wellness Visit.    The following portions of the patient's history were reviewed and   updated as appropriate: allergies, current medications, past family history, past medical history, past social history, past surgical history, and problem list.    Compared to one year ago, the patient's physical   health is the same.  Compared to one year ago, the patient's mental   health is worse.    Recent Hospitalizations:  He was not admitted to the hospital during the last year.     Current Medical Providers:  Patient Care Team:  Harry Virk MD as PCP - General (Family Medicine)    Outpatient Medications Prior to Visit   Medication Sig Dispense Refill    ASPIRIN 81 PO Take 1 tablet by mouth Daily.      carvedilol (COREG) 12.5 MG tablet Take 1 tablet by mouth 2 (Two) Times a Day. 180 tablet 3    magnesium oxide (MAG-OX) 400 MG tablet Take 1 tablet by mouth Daily.      multivitamin (THERAGRAN) tablet tablet Take  by mouth Daily.      Quercetin 500 MG capsule Take 355 mg by mouth Daily.      tamsulosin (FLOMAX) 0.4 MG capsule 24 hr capsule Take 1 capsule by mouth Daily.      torsemide (DEMADEX) 100 MG tablet Take 1 tablet by mouth Daily. 90 tablet 3    TRAZODONE HCL PO Take  by mouth As Needed.      Turmeric-Ginger 150-25 MG chewable tablet Chew 2,360 mg Daily. Turmeric, Bioerine, Garlic, ginger.      vitamin C (ASCORBIC ACID) 250 MG tablet Take 2 tablets by mouth Daily.      vitamin D3 125 MCG (5000 UT) capsule capsule Take 1 capsule by mouth Daily.      Zinc 50 MG tablet Take 1 tablet by mouth Daily.      allopurinol (ZYLOPRIM) 100 MG tablet Take 1 tablet by mouth Every Evening. 30 tablet 2    allopurinol (ZYLOPRIM) 300 MG tablet Take 1 tablet by mouth Every Morning. 90 tablet 0    fenofibrate (Tricor) 145 MG tablet Take 1 tablet by mouth Daily. 90 tablet 1    metOLazone  (ZAROXOLYN) 5 MG tablet Take 1 tablet by mouth Daily. 3 tablet 0    furosemide (LASIX) 20 MG tablet Take 1 tablet by mouth 2 (Two) Times a Day.       No facility-administered medications prior to visit.     No opioid medication identified on active medication list. I have reviewed chart for other potential  high risk medication/s and harmful drug interactions in the elderly.      Aspirin is on active medication list. Aspirin use is indicated based on review of current medical condition/s. Pros and cons of this therapy have been discussed today. Benefits of this medication outweigh potential harm.  Patient has been encouraged to continue taking this medication.  .      Patient Active Problem List   Diagnosis    Chronic gout without tophus    Primary hypertension    Dyslipidemia    Rheumatoid arthritis    Bilateral lower extremity edema    Shortness of breath    Coronary artery disease involving native coronary artery of native heart without angina pectoris    Abnormal SPEP    Chronic back pain    Current smoker    Degenerative disc disease, lumbar    Depression    Diastolic heart failure    Edema    Healthcare maintenance    Encounter for long-term (current) use of medications    Enlarged prostate with urinary obstruction    Former smoker    Generalized pain    Gouty arthropathy    Gross hematuria    Idiopathic chronic gout of multiple sites without tophus    Idiopathic peripheral neuropathy    Knee pain    Leg cramping    Lymphedema    Malignant neoplasm of lateral wall of urinary bladder    Urothelial carcinoma of bladder without invasion of muscle    Idiopathic progressive neuropathy    Class 2 severe obesity due to excess calories with serious comorbidity and body mass index (BMI) of 37.0 to 37.9 in adult    Obesity    Personal history of other endocrine, nutritional and metabolic disease    Polyarthralgia    RBBB    Sleep apnea    Snoring    Stage 3 chronic kidney disease    Hypertensive chronic kidney disease  "with stage 1 through stage 4 chronic kidney disease, or unspecified chronic kidney disease    Valvular heart disease    Gout    Coronary artery disease involving native coronary artery of native heart without angina pectoris    Ulnar neuropathy of left upper extremity    Type 2 diabetes mellitus with hyperglycemia, without long-term current use of insulin     Advance Care Planning Advance Directive is on file.  ACP discussion was held with the patient during this visit. Patient has an advance directive in EMR which is still valid.             Objective   Vitals:    09/09/24 1055   BP: 115/72   BP Location: Left arm   Patient Position: Sitting   Cuff Size: Large Adult   Pulse: 92   Resp: 18   Temp: 97.1 °F (36.2 °C)   TempSrc: Temporal   SpO2: 98%   Weight: 119 kg (262 lb 12.8 oz)   Height: 177.8 cm (70\")       Estimated body mass index is 37.71 kg/m² as calculated from the following:    Height as of this encounter: 177.8 cm (70\").    Weight as of this encounter: 119 kg (262 lb 12.8 oz).    Class 2 Severe Obesity (BMI >=35 and <=39.9). Obesity-related health conditions include the following: hypertension, coronary heart disease, and dyslipidemias. Obesity is unchanged. BMI is is above average; BMI management plan is completed. We discussed portion control and increasing exercise.       Does the patient have evidence of cognitive impairment? No  Lab Results   Component Value Date    TRIG 1,075 (H) 09/12/2024    HDL 23 (L) 09/12/2024    LDL  09/12/2024      Comment:      Unable to calculate    VLDL  09/12/2024      Comment:      Unable to calculate                                                                                                Health  Risk Assessment    Smoking Status:  Social History     Tobacco Use   Smoking Status Former    Current packs/day: 0.00    Average packs/day: 1 pack/day for 50.0 years (50.0 ttl pk-yrs)    Types: Cigarettes    Start date: 4/1/1968    Quit date: 4/1/2018    Years since " quittin.5    Passive exposure: Past   Smokeless Tobacco Never     Alcohol Consumption:  Social History     Substance and Sexual Activity   Alcohol Use Yes    Alcohol/week: 3.0 standard drinks of alcohol    Types: 1 Cans of beer, 2 Shots of liquor per week    Comment: Some weeks none       Fall Risk Screen  STARADI Fall Risk Assessment was completed, and patient is at LOW risk for falls.Assessment completed on:2024    Depression Screenin/9/2024    10:54 AM   PHQ-2/PHQ-9 Depression Screening   Little Interest or Pleasure in Doing Things 0-->not at all   Feeling Down, Depressed or Hopeless 0-->not at all   PHQ-9: Brief Depression Severity Measure Score 0     Health Habits and Functional and Cognitive Screenin/9/2024    10:00 AM   Functional & Cognitive Status   Do you have difficulty preparing food and eating? No   Do you have difficulty bathing yourself, getting dressed or grooming yourself? No   Do you have difficulty using the toilet? No   Do you have difficulty moving around from place to place? No   Do you have trouble with steps or getting out of a bed or a chair? No   Current Diet Well Balanced Diet   Dental Exam Not up to date   Eye Exam Not up to date   Exercise (times per week) 0 times per week   Current Exercises Include No Regular Exercise   Do you need help using the phone?  No   Are you deaf or do you have serious difficulty hearing?  Yes   Do you need help to go to places out of walking distance? No   Do you need help shopping? No   Do you need help preparing meals?  No   Do you need help with housework?  No   Do you need help with laundry? No   Do you need help taking your medications? No   Do you need help managing money? No   Do you ever drive or ride in a car without wearing a seat belt? No   Have you felt unusual stress, anger or loneliness in the last month? Yes   Who do you live with? Spouse   If you need help, do you have trouble finding someone available to you? No    Have you been bothered in the last four weeks by sexual problems? No   Do you have difficulty concentrating, remembering or making decisions? No           Age-appropriate Screening Schedule:  Refer to the list below for future screening recommendations based on patient's age, sex and/or medical conditions. Orders for these recommended tests are listed in the plan section. The patient has been provided with a written plan.    Health Maintenance List  Health Maintenance   Topic Date Due    ZOSTER VACCINE (1 of 2) Never done    COLORECTAL CANCER SCREENING  04/05/2023    INFLUENZA VACCINE  Never done    HEMOGLOBIN A1C  10/09/2024    LUNG CANCER SCREENING  03/08/2025 (Originally 1/3/2021)    DIABETIC EYE EXAM  09/01/2025 (Originally 3/28/2017)    Pneumococcal Vaccine 65+ (1 of 2 - PCV) 09/09/2025 (Originally 5/18/1956)    TDAP/TD VACCINES (2 - Td or Tdap) 09/09/2025 (Originally 5/4/2017)    URINE MICROALBUMIN  03/08/2025    ANNUAL WELLNESS VISIT  09/09/2025    DIABETIC FOOT EXAM  09/09/2025    BMI FOLLOWUP  09/09/2025    LIPID PANEL  09/12/2025    HEPATITIS C SCREENING  Completed    AAA SCREEN (ONE-TIME)  Completed    COVID-19 Vaccine  Discontinued                                                                                                                                                CMS Preventative Services Quick Reference  Risk Factors Identified During Encounter  Immunizations Discussed/Encouraged: Pneumococcal 23, Prevnar 20 (Pneumococcal 20-valent conjugate), Vaxneuvance (Pneumococcal 15-valent conjugate), Shingrix, COVID19, and RSV (Respiratory Syncytial Virus)    The above risks/problems have been discussed with the patient.  Pertinent information has been shared with the patient in the After Visit Summary.  An After Visit Summary and PPPS were made available to the patient.    Follow Up:   Next Medicare Wellness visit to be scheduled in 1 year.         Additional E&M Note during same encounter  "follows:  Patient has additional, significant, and separately identifiable condition(s)/problem(s) that require work above and beyond the Medicare Wellness Visit     Chief Complaint  Medicare Wellness-subsequent    Subjective   HPI  Fer is also being seen today for an annual adult preventative physical exam. The patient has diabetes, hypertension, dyslipidemia, CAD, and obesity.  The patient denies any chest pains, palpitations, shortness of breath, headaches or dizziness.  The patient is also needing refills on some of her current medications.                Objective   Vital Signs:  /72 (BP Location: Left arm, Patient Position: Sitting, Cuff Size: Large Adult)   Pulse 92   Temp 97.1 °F (36.2 °C) (Temporal)   Resp 18   Ht 177.8 cm (70\")   Wt 119 kg (262 lb 12.8 oz)   SpO2 98%   BMI 37.71 kg/m²   Physical Exam  Constitutional:       Appearance: Normal appearance. He is obese.   HENT:      Head: Normocephalic and atraumatic.      Right Ear: Tympanic membrane and ear canal normal.      Left Ear: Tympanic membrane and ear canal normal.      Nose: Nose normal.      Mouth/Throat:      Mouth: Mucous membranes are moist.      Pharynx: Oropharynx is clear.   Eyes:      Extraocular Movements: Extraocular movements intact.      Conjunctiva/sclera: Conjunctivae normal.      Pupils: Pupils are equal, round, and reactive to light.   Cardiovascular:      Rate and Rhythm: Normal rate and regular rhythm.      Pulses: Normal pulses.      Heart sounds: Normal heart sounds. No murmur heard.  Pulmonary:      Effort: Pulmonary effort is normal.      Breath sounds: Normal breath sounds. No decreased breath sounds, wheezing, rhonchi or rales.   Abdominal:      General: Abdomen is protuberant. Bowel sounds are normal.      Tenderness: There is no abdominal tenderness. There is no right CVA tenderness, left CVA tenderness, guarding or rebound.      Hernia: No hernia is present.   Musculoskeletal:      Cervical back: Normal " range of motion and neck supple.      Right lower leg: No edema.      Left lower leg: No edema.   Feet:      Right foot:      Skin integrity: Skin integrity normal.      Toenail Condition: Right toenails are normal.      Left foot:      Toenail Condition: Left toenails are normal.   Lymphadenopathy:      Head:      Right side of head: No submental, submandibular, preauricular, posterior auricular or occipital adenopathy.      Left side of head: No submental, submandibular, preauricular, posterior auricular or occipital adenopathy.      Cervical: No cervical adenopathy.      Upper Body:      Right upper body: No supraclavicular or axillary adenopathy.      Left upper body: No supraclavicular or axillary adenopathy.   Skin:     General: Skin is warm.      Capillary Refill: Capillary refill takes less than 2 seconds.      Findings: No rash.      Nails: There is no clubbing.   Neurological:      General: No focal deficit present.      Mental Status: He is alert and oriented to person, place, and time.      Sensory: Sensation is intact.      Motor: Motor function is intact.      Coordination: Coordination is intact.      Gait: Gait is intact.      Deep Tendon Reflexes: Reflexes are normal and symmetric.   Psychiatric:         Attention and Perception: Attention and perception normal.         Mood and Affect: Mood and affect normal.         Speech: Speech normal.         Behavior: Behavior normal. Behavior is cooperative.         Thought Content: Thought content normal.         Cognition and Memory: Cognition normal.         Judgment: Judgment normal.                 Assessment and Plan               Medicare annual wellness visit, subsequent    Primary hypertension  Hypertension is stable and controlled  Continue current treatment regimen.  Blood pressure will be reassessed in 6 months.  Hyperlipidemia, unspecified hyperlipidemia type   Lipid abnormalities are stable    Plan:  Continue same medication/s without change.       Discussed medication dosage, use, side effects, and goals of treatment in detail.    Counseled patient on lifestyle modifications to help control hyperlipidemia.     Patient Treatment Goals:   LDL goal is less than 70    Followup in 6 months.  Coronary artery disease involving native coronary artery of native heart without angina pectoris  Coronary Artery Disease (OPTIONAL): Coronary artery disease is stable.  Continue current treatment regimen.  Follows with cardiology.  Cardiac status will be reassessed in 6 months.  Chronic gout without tophus, unspecified cause, unspecified site  Stable on allopurinol.  Type 2 diabetes mellitus with hyperglycemia, without long-term current use of insulin  Diabetes is stable.   Continue current treatment regimen.  Diabetes will be reassessed in 6 months  Class 2 severe obesity with serious comorbidity and body mass index (BMI) of 37.0 to 37.9 in adult, unspecified obesity type  Patient's (Body mass index is 37.71 kg/m².) indicates that they are morbidly/severely obese (BMI > 40 or > 35 with obesity - related health condition) with health conditions that include hypertension, coronary heart disease, and dyslipidemias . Weight is unchanged. BMI  is above average; BMI management plan is completed. We discussed portion control and increasing exercise.   Screening for prostate cancer    Encounter for screening colonoscopy    Dyslipidemia  Stable on current medication.    Orders Placed This Encounter   Procedures    Comprehensive Metabolic Panel     Standing Status:   Future     Number of Occurrences:   1     Standing Expiration Date:   9/9/2025     Order Specific Question:   Release to patient     Answer:   Routine Release [[id number]]    Lipid Panel     Standing Status:   Future     Number of Occurrences:   1     Standing Expiration Date:   9/9/2025     Order Specific Question:   Release to patient     Answer:   Routine Release [[id number]]    TSH     Standing Status:   Future      Number of Occurrences:   1     Standing Expiration Date:   9/9/2025     Order Specific Question:   Release to patient     Answer:   Routine Release [[id number]]    Hepatitis C Antibody     Standing Status:   Future     Number of Occurrences:   1     Standing Expiration Date:   9/9/2025     Order Specific Question:   Release to patient     Answer:   Routine Release [[id number]]    PSA Screen     Standing Status:   Future     Number of Occurrences:   1     Standing Expiration Date:   9/9/2025     Order Specific Question:   Release to patient     Answer:   Routine Release [[id number]]    Cologuard - Stool, Per Rectum     Standing Status:   Future     Number of Occurrences:   1     Standing Expiration Date:   9/9/2025     Order Specific Question:   Release to patient     Answer:   Routine Release [[id number]]    POC Urinalysis Dipstick     Standing Status:   Future     Standing Expiration Date:   9/9/2025     Order Specific Question:   Release to patient     Answer:   Routine Release [[id number]]    POC Glycosylated Hemoglobin (Hb A1C)     Order Specific Question:   Release to patient     Answer:   Routine Release [[id number]]    CBC & Differential     Standing Status:   Future     Number of Occurrences:   1     Standing Expiration Date:   9/9/2025     Order Specific Question:   Manual Differential     Answer:   No     Order Specific Question:   Release to patient     Answer:   Routine Release [[id number]]     New Medications Ordered This Visit   Medications    allopurinol (ZYLOPRIM) 100 MG tablet     Sig: Take 1 tablet by mouth Every Evening for 90 days.     Dispense:  90 tablet     Refill:  3    allopurinol (ZYLOPRIM) 300 MG tablet     Sig: Take 1 tablet by mouth Every Morning for 90 days.     Dispense:  90 tablet     Refill:  3    fenofibrate (Tricor) 145 MG tablet     Sig: Take 1 tablet by mouth Daily for 90 days.     Dispense:  90 tablet     Refill:  3        I spent 30 minutes caring for Fer on this date of  service. This time includes time spent by me in the following activities:preparing for the visit, performing a medically appropriate examination and/or evaluation , counseling and educating the patient/family/caregiver, ordering medications, tests, or procedures, and documenting information in the medical record  Follow Up   No follow-ups on file.  Patient was given instructions and counseling regarding his condition or for health maintenance advice. Please see specific information pulled into the AVS if appropriate.    The patient is advised to continue all of his regular medications as prescribed. He was counseled regarding the importance of diet, exercise and medication compliance.    The preventive exam has been reviewed in detail.  The patient has been fully counseled on preventative guidelines for vaccines, cancer screenings, and other health maintenance needs.   The patient has been counseled on guidelines for maintaining a lifestyle to promote good health and to minimize chronic diseases.  The patient has been assisted with scheduling these healthcare procedures for the coming year and given a written document of health maintenance and anticipatory guidance for age with the AVS.      This document has been electronically signed by Harry Virk MD  October 3, 2024 13:22 EDT

## 2024-09-09 NOTE — ASSESSMENT & PLAN NOTE
Patient's (Body mass index is 37.71 kg/m².) indicates that they are morbidly/severely obese (BMI > 40 or > 35 with obesity - related health condition) with health conditions that include hypertension, coronary heart disease, and dyslipidemias . Weight is unchanged. BMI  is above average; BMI management plan is completed. We discussed portion control and increasing exercise.    hard copy, drawn during this pregnancy

## 2024-09-09 NOTE — ASSESSMENT & PLAN NOTE
Coronary Artery Disease (OPTIONAL): Coronary artery disease is stable.  Continue current treatment regimen.  Follows with cardiology.  Cardiac status will be reassessed in 6 months.

## 2024-09-12 ENCOUNTER — LAB (OUTPATIENT)
Dept: LAB | Facility: HOSPITAL | Age: 74
End: 2024-09-12
Payer: MEDICARE

## 2024-09-12 DIAGNOSIS — Z12.5 SCREENING FOR PROSTATE CANCER: ICD-10-CM

## 2024-09-12 DIAGNOSIS — Z00.00 MEDICARE ANNUAL WELLNESS VISIT, SUBSEQUENT: ICD-10-CM

## 2024-09-12 LAB
ALBUMIN SERPL-MCNC: 4.5 G/DL (ref 3.5–5.2)
ALBUMIN/GLOB SERPL: 1.4 G/DL
ALP SERPL-CCNC: 69 U/L (ref 39–117)
ALT SERPL W P-5'-P-CCNC: 42 U/L (ref 1–41)
ANION GAP SERPL CALCULATED.3IONS-SCNC: 15.5 MMOL/L (ref 5–15)
AST SERPL-CCNC: 26 U/L (ref 1–40)
BASOPHILS # BLD AUTO: 0.05 10*3/MM3 (ref 0–0.2)
BASOPHILS NFR BLD AUTO: 0.6 % (ref 0–1.5)
BILIRUB SERPL-MCNC: 0.4 MG/DL (ref 0–1.2)
BUN SERPL-MCNC: 47 MG/DL (ref 8–23)
BUN/CREAT SERPL: 24.5 (ref 7–25)
CALCIUM SPEC-SCNC: 13.7 MG/DL (ref 8.6–10.5)
CHLORIDE SERPL-SCNC: 85 MMOL/L (ref 98–107)
CHOLEST SERPL-MCNC: 235 MG/DL (ref 0–200)
CO2 SERPL-SCNC: 31.5 MMOL/L (ref 22–29)
CREAT SERPL-MCNC: 1.92 MG/DL (ref 0.76–1.27)
DEPRECATED RDW RBC AUTO: 50.7 FL (ref 37–54)
EGFRCR SERPLBLD CKD-EPI 2021: 36.6 ML/MIN/1.73
EOSINOPHIL # BLD AUTO: 0.09 10*3/MM3 (ref 0–0.4)
EOSINOPHIL NFR BLD AUTO: 1 % (ref 0.3–6.2)
ERYTHROCYTE [DISTWIDTH] IN BLOOD BY AUTOMATED COUNT: 13.2 % (ref 12.3–15.4)
GLOBULIN UR ELPH-MCNC: 3.3 GM/DL
GLUCOSE SERPL-MCNC: 216 MG/DL (ref 65–99)
HCT VFR BLD AUTO: 44.1 % (ref 37.5–51)
HCV AB SER QL: NORMAL
HDLC SERPL-MCNC: 23 MG/DL (ref 40–60)
HGB BLD-MCNC: 15.5 G/DL (ref 13–17.7)
IMM GRANULOCYTES # BLD AUTO: 0.05 10*3/MM3 (ref 0–0.05)
IMM GRANULOCYTES NFR BLD AUTO: 0.6 % (ref 0–0.5)
LDLC SERPL CALC-MCNC: ABNORMAL MG/DL
LDLC/HDLC SERPL: ABNORMAL {RATIO}
LYMPHOCYTES # BLD AUTO: 2.04 10*3/MM3 (ref 0.7–3.1)
LYMPHOCYTES NFR BLD AUTO: 22.9 % (ref 19.6–45.3)
MCH RBC QN AUTO: 36.9 PG (ref 26.6–33)
MCHC RBC AUTO-ENTMCNC: 35.1 G/DL (ref 31.5–35.7)
MCV RBC AUTO: 105 FL (ref 79–97)
MONOCYTES # BLD AUTO: 0.89 10*3/MM3 (ref 0.1–0.9)
MONOCYTES NFR BLD AUTO: 10 % (ref 5–12)
NEUTROPHILS NFR BLD AUTO: 5.79 10*3/MM3 (ref 1.7–7)
NEUTROPHILS NFR BLD AUTO: 64.9 % (ref 42.7–76)
NRBC BLD AUTO-RTO: 0 /100 WBC (ref 0–0.2)
PLATELET # BLD AUTO: 248 10*3/MM3 (ref 140–450)
PMV BLD AUTO: 11 FL (ref 6–12)
POTASSIUM SERPL-SCNC: 3.3 MMOL/L (ref 3.5–5.2)
PROT SERPL-MCNC: 7.8 G/DL (ref 6–8.5)
RBC # BLD AUTO: 4.2 10*6/MM3 (ref 4.14–5.8)
SODIUM SERPL-SCNC: 132 MMOL/L (ref 136–145)
TRIGL SERPL-MCNC: 1075 MG/DL (ref 0–150)
TSH SERPL DL<=0.05 MIU/L-ACNC: 1.16 UIU/ML (ref 0.27–4.2)
VLDLC SERPL-MCNC: ABNORMAL MG/DL
WBC NRBC COR # BLD AUTO: 8.91 10*3/MM3 (ref 3.4–10.8)

## 2024-09-12 PROCEDURE — 84443 ASSAY THYROID STIM HORMONE: CPT

## 2024-09-12 PROCEDURE — 85025 COMPLETE CBC W/AUTO DIFF WBC: CPT

## 2024-09-12 PROCEDURE — 80053 COMPREHEN METABOLIC PANEL: CPT

## 2024-09-12 PROCEDURE — G0103 PSA SCREENING: HCPCS

## 2024-09-12 PROCEDURE — 80061 LIPID PANEL: CPT

## 2024-09-12 PROCEDURE — 86803 HEPATITIS C AB TEST: CPT

## 2024-09-13 LAB — PSA SERPL-MCNC: 3.1 NG/ML (ref 0–4)

## 2024-09-24 ENCOUNTER — HOSPITAL ENCOUNTER (OUTPATIENT)
Dept: CARDIOLOGY | Facility: HOSPITAL | Age: 74
Discharge: HOME | End: 2024-09-24
Payer: MEDICARE

## 2024-09-24 ENCOUNTER — TELEPHONE (OUTPATIENT)
Dept: FAMILY MEDICINE CLINIC | Facility: CLINIC | Age: 74
End: 2024-09-24
Payer: MEDICARE

## 2024-09-24 DIAGNOSIS — E78.5 DYSLIPIDEMIA: ICD-10-CM

## 2024-09-24 DIAGNOSIS — R60.0 BILATERAL LOWER EXTREMITY EDEMA: ICD-10-CM

## 2024-09-24 DIAGNOSIS — I10 PRIMARY HYPERTENSION: ICD-10-CM

## 2024-09-24 DIAGNOSIS — R06.02 SHORTNESS OF BREATH: ICD-10-CM

## 2024-09-24 DIAGNOSIS — I25.10 CORONARY ARTERY DISEASE INVOLVING NATIVE CORONARY ARTERY OF NATIVE HEART WITHOUT ANGINA PECTORIS: ICD-10-CM

## 2024-09-24 DIAGNOSIS — I73.9 CLAUDICATION: ICD-10-CM

## 2024-09-24 DIAGNOSIS — R07.89 ATYPICAL CHEST PAIN: ICD-10-CM

## 2024-09-24 PROCEDURE — 0 TECHNETIUM SESTAMIBI: Performed by: INTERNAL MEDICINE

## 2024-09-24 PROCEDURE — 78452 HT MUSCLE IMAGE SPECT MULT: CPT

## 2024-09-24 PROCEDURE — 93925 LOWER EXTREMITY STUDY: CPT

## 2024-09-24 PROCEDURE — 25010000002 REGADENOSON 0.4 MG/5ML SOLUTION: Performed by: INTERNAL MEDICINE

## 2024-09-24 PROCEDURE — A9500 TC99M SESTAMIBI: HCPCS | Performed by: INTERNAL MEDICINE

## 2024-09-24 PROCEDURE — 93017 CV STRESS TEST TRACING ONLY: CPT

## 2024-09-24 PROCEDURE — 93306 TTE W/DOPPLER COMPLETE: CPT

## 2024-09-24 RX ORDER — REGADENOSON 0.08 MG/ML
0.4 INJECTION, SOLUTION INTRAVENOUS
Status: COMPLETED | OUTPATIENT
Start: 2024-09-24 | End: 2024-09-24

## 2024-09-24 RX ADMIN — TECHNETIUM TC 99M SESTAMIBI 1 DOSE: 1 INJECTION INTRAVENOUS at 10:34

## 2024-09-24 RX ADMIN — REGADENOSON 0.4 MG: 0.08 INJECTION, SOLUTION INTRAVENOUS at 10:34

## 2024-09-24 RX ADMIN — TECHNETIUM TC 99M SESTAMIBI 1 DOSE: 1 INJECTION INTRAVENOUS at 08:20

## 2024-09-25 DIAGNOSIS — E83.52 SERUM CALCIUM ELEVATED: ICD-10-CM

## 2024-09-25 DIAGNOSIS — E11.65 TYPE 2 DIABETES MELLITUS WITH HYPERGLYCEMIA, WITHOUT LONG-TERM CURRENT USE OF INSULIN: Primary | ICD-10-CM

## 2024-09-25 DIAGNOSIS — E11.65 TYPE 2 DIABETES MELLITUS WITH HYPERGLYCEMIA, WITHOUT LONG-TERM CURRENT USE OF INSULIN: ICD-10-CM

## 2024-09-25 DIAGNOSIS — N18.32 STAGE 3B CHRONIC KIDNEY DISEASE: Primary | ICD-10-CM

## 2024-09-25 DIAGNOSIS — N18.31 STAGE 3A CHRONIC KIDNEY DISEASE: ICD-10-CM

## 2024-09-25 RX ORDER — CARVEDILOL 6.25 MG/1
6.25 TABLET ORAL 2 TIMES DAILY WITH MEALS
Qty: 180 TABLET | Refills: 0 | OUTPATIENT
Start: 2024-09-25

## 2024-09-28 LAB
BH CV REST NUCLEAR ISOTOPE DOSE: 10 MCI
BH CV STRESS COMMENTS STAGE 1: NORMAL
BH CV STRESS DOSE REGADENOSON STAGE 1: 0.4
BH CV STRESS DURATION MIN STAGE 1: 0
BH CV STRESS DURATION SEC STAGE 1: 10
BH CV STRESS NUCLEAR ISOTOPE DOSE: 30 MCI
BH CV STRESS PROTOCOL 1: NORMAL
BH CV STRESS RECOVERY BP: NORMAL MMHG
BH CV STRESS RECOVERY HR: 86 BPM
BH CV STRESS STAGE 1: 1
MAXIMAL PREDICTED HEART RATE: 148 BPM
PERCENT MAX PREDICTED HR: 60.81 %
STRESS BASELINE BP: NORMAL MMHG
STRESS BASELINE HR: 80 BPM
STRESS PERCENT HR: 72 %
STRESS POST PEAK BP: NORMAL MMHG
STRESS POST PEAK HR: 90 BPM
STRESS TARGET HR: 126 BPM

## 2024-09-29 LAB
BH CV ECHO MEAS - ACS: 2.11 CM
BH CV ECHO MEAS - AO MAX PG: 9.2 MMHG
BH CV ECHO MEAS - AO MEAN PG: 4.8 MMHG
BH CV ECHO MEAS - AO ROOT DIAM: 3.1 CM
BH CV ECHO MEAS - AO V2 MAX: 152 CM/SEC
BH CV ECHO MEAS - AO V2 VTI: 31.2 CM
BH CV ECHO MEAS - EDV(CUBED): 96.1 ML
BH CV ECHO MEAS - EDV(MOD-SP4): 109 ML
BH CV ECHO MEAS - EF(MOD-SP4): 56.7 %
BH CV ECHO MEAS - ESV(CUBED): 16.2 ML
BH CV ECHO MEAS - ESV(MOD-SP4): 47.2 ML
BH CV ECHO MEAS - FS: 44.8 %
BH CV ECHO MEAS - IVS/LVPW: 0.98 CM
BH CV ECHO MEAS - IVSD: 1.3 CM
BH CV ECHO MEAS - LA DIMENSION: 3.8 CM
BH CV ECHO MEAS - LAT PEAK E' VEL: 7.7 CM/SEC
BH CV ECHO MEAS - LV DIASTOLIC VOL/BSA (35-75): 46.6 CM2
BH CV ECHO MEAS - LV MASS(C)D: 232.5 GRAMS
BH CV ECHO MEAS - LV SYSTOLIC VOL/BSA (12-30): 20.2 CM2
BH CV ECHO MEAS - LVIDD: 4.6 CM
BH CV ECHO MEAS - LVIDS: 2.5 CM
BH CV ECHO MEAS - LVPWD: 1.33 CM
BH CV ECHO MEAS - MED PEAK E' VEL: 6.3 CM/SEC
BH CV ECHO MEAS - MV A MAX VEL: 111 CM/SEC
BH CV ECHO MEAS - MV DEC TIME: 0.21 SEC
BH CV ECHO MEAS - MV E MAX VEL: 88.6 CM/SEC
BH CV ECHO MEAS - MV E/A: 0.8
BH CV ECHO MEAS - RVDD: 3.1 CM
BH CV ECHO MEAS - SV(MOD-SP4): 61.8 ML
BH CV ECHO MEAS - SVI(MOD-SP4): 26.4 ML/M2
BH CV ECHO MEASUREMENTS AVERAGE E/E' RATIO: 12.66
BH CV LEA LEFT ANT TIBIAL A DISTAL PSV: 157 CM/S
BH CV LEA LEFT CFA PROX PSV: 105 CM/S
BH CV LEA LEFT DFA PROX PSV: 55 CM/S
BH CV LEA LEFT POPITEAL A  PROX PSV: 73 CM/S
BH CV LEA LEFT PTA DISTAL PSV: 96 CM/S
BH CV LEA LEFT SFA DISTAL PSV: -79.5 CM/S
BH CV LEA LEFT SFA MID PSV: -99.5 CM/S
BH CV LEA LEFT SFA PROX PSV: -107 CM/S
BH CV LEA RIGHT ANT TIBIAL A DISTAL PSV: 51 CM/S
BH CV LEA RIGHT CFA PROX PSV: 130 CM/S
BH CV LEA RIGHT DFA PROX PSV: 65 CM/S
BH CV LEA RIGHT POPITEAL A  PROX PSV: 65 CM/S
BH CV LEA RIGHT PTA DISTAL PSV: 90 CM/S
BH CV LEA RIGHT SFA DISTAL PSV: -70.6 CM/S
BH CV LEA RIGHT SFA MID PSV: -70.1 CM/S
BH CV LEA RIGHT SFA PROX PSV: -101 CM/S
LEFT ATRIUM VOLUME INDEX: 19.1 ML/M2
LEFT GROIN CFA SYS: 105 CM/SEC
RIGHT GROIN CFA SYS: 130 CM/SEC

## 2024-10-01 ENCOUNTER — TELEPHONE (OUTPATIENT)
Dept: CARDIOLOGY | Facility: CLINIC | Age: 74
End: 2024-10-01
Payer: MEDICARE

## 2024-10-01 NOTE — TELEPHONE ENCOUNTER
RELAY  STRESS/ECHO  Called patient to notify of no acute findings or abnormalities. Keep follow up as scheduled. If you have any problem between now and then give our office a call.   ----- Message from Thalia KERN sent at 9/30/2024  4:42 PM EDT -----    ----- Message -----  From: Curtis Grissom APRN  Sent: 9/30/2024  12:34 PM EDT  To: Thalia Hassan MA    Patient was found to have a normal stress test with no evidence of ischemia.  Keep follow-up.

## 2024-10-03 ENCOUNTER — OFFICE VISIT (OUTPATIENT)
Dept: CARDIOLOGY | Facility: CLINIC | Age: 74
End: 2024-10-03
Payer: MEDICARE

## 2024-10-03 VITALS
HEIGHT: 70 IN | BODY MASS INDEX: 38.17 KG/M2 | WEIGHT: 266.6 LBS | DIASTOLIC BLOOD PRESSURE: 81 MMHG | OXYGEN SATURATION: 95 % | HEART RATE: 81 BPM | SYSTOLIC BLOOD PRESSURE: 131 MMHG

## 2024-10-03 DIAGNOSIS — I25.10 CORONARY ARTERY DISEASE INVOLVING NATIVE CORONARY ARTERY OF NATIVE HEART WITHOUT ANGINA PECTORIS: ICD-10-CM

## 2024-10-03 DIAGNOSIS — I10 PRIMARY HYPERTENSION: Primary | ICD-10-CM

## 2024-10-03 PROCEDURE — 99213 OFFICE O/P EST LOW 20 MIN: CPT | Performed by: NURSE PRACTITIONER

## 2024-10-03 PROCEDURE — 1160F RVW MEDS BY RX/DR IN RCRD: CPT | Performed by: NURSE PRACTITIONER

## 2024-10-03 PROCEDURE — 3075F SYST BP GE 130 - 139MM HG: CPT | Performed by: NURSE PRACTITIONER

## 2024-10-03 PROCEDURE — 3079F DIAST BP 80-89 MM HG: CPT | Performed by: NURSE PRACTITIONER

## 2024-10-03 PROCEDURE — 1159F MED LIST DOCD IN RCRD: CPT | Performed by: NURSE PRACTITIONER

## 2024-10-03 NOTE — PATIENT INSTRUCTIONS
Health Maintenance, Male  Adopting a healthy lifestyle and getting preventive care are important in promoting health and wellness. Ask your health care provider about:  The right schedule for you to have regular tests and exams.  Things you can do on your own to prevent diseases and keep yourself healthy.  What should I know about diet, weight, and exercise?  Eat a healthy diet    Eat a diet that includes plenty of vegetables, fruits, low-fat dairy products, and lean protein.  Do not eat a lot of foods that are high in solid fats, added sugars, or sodium.  Maintain a healthy weight  Body mass index (BMI) is a measurement that can be used to identify possible weight problems. It estimates body fat based on height and weight. Your health care provider can help determine your BMI and help you achieve or maintain a healthy weight.  Get regular exercise  Get regular exercise. This is one of the most important things you can do for your health. Most adults should:  Exercise for at least 150 minutes each week. The exercise should increase your heart rate and make you sweat (moderate-intensity exercise).  Do strengthening exercises at least twice a week. This is in addition to the moderate-intensity exercise.  Spend less time sitting. Even light physical activity can be beneficial.  Watch cholesterol and blood lipids  Have your blood tested for lipids and cholesterol at 20 years of age, then have this test every 5 years.  You may need to have your cholesterol levels checked more often if:  Your lipid or cholesterol levels are high.  You are older than 40 years of age.  You are at high risk for heart disease.  What should I know about cancer screening?  Many types of cancers can be detected early and may often be prevented. Depending on your health history and family history, you may need to have cancer screening at various ages. This may include screening for:  Colorectal cancer.  Prostate cancer.  Skin cancer.  Lung  cancer.  What should I know about heart disease, diabetes, and high blood pressure?  Blood pressure and heart disease  High blood pressure causes heart disease and increases the risk of stroke. This is more likely to develop in people who have high blood pressure readings or are overweight.  Talk with your health care provider about your target blood pressure readings.  Have your blood pressure checked:  Every 3-5 years if you are 18-39 years of age.  Every year if you are 40 years old or older.  If you are between the ages of 65 and 75 and are a current or former smoker, ask your health care provider if you should have a one-time screening for abdominal aortic aneurysm (AAA).  Diabetes  Have regular diabetes screenings. This checks your fasting blood sugar level. Have the screening done:  Once every three years after age 45 if you are at a normal weight and have a low risk for diabetes.  More often and at a younger age if you are overweight or have a high risk for diabetes.  What should I know about preventing infection?  Hepatitis B  If you have a higher risk for hepatitis B, you should be screened for this virus. Talk with your health care provider to find out if you are at risk for hepatitis B infection.  Hepatitis C  Blood testing is recommended for:  Everyone born from 1945 through 1965.  Anyone with known risk factors for hepatitis C.  Sexually transmitted infections (STIs)  You should be screened each year for STIs, including gonorrhea and chlamydia, if:  You are sexually active and are younger than 24 years of age.  You are older than 24 years of age and your health care provider tells you that you are at risk for this type of infection.  Your sexual activity has changed since you were last screened, and you are at increased risk for chlamydia or gonorrhea. Ask your health care provider if you are at risk.  Ask your health care provider about whether you are at high risk for HIV. Your health care provider  may recommend a prescription medicine to help prevent HIV infection. If you choose to take medicine to prevent HIV, you should first get tested for HIV. You should then be tested every 3 months for as long as you are taking the medicine.  Follow these instructions at home:  Alcohol use  Do not drink alcohol if your health care provider tells you not to drink.  If you drink alcohol:  Limit how much you have to 0-2 drinks a day.  Know how much alcohol is in your drink. In the [place] one drink equals one 12 oz bottle of beer (355 mL), one 5 oz glass of wine (148 mL), or one 1½ oz glass of hard liquor (44 mL).  Lifestyle  Do not use any products that contain nicotine or tobacco. These products include cigarettes, chewing tobacco, and vaping devices, such as e-cigarettes. If you need help quitting, ask your health care provider.  Do not use street drugs.  Do not share needles.  Ask your health care provider for help if you need support or information about quitting drugs.  General instructions  Schedule regular health, dental, and eye exams.  Stay current with your vaccines.  Tell your health care provider if:  You often feel depressed.  You have ever been abused or do not feel safe at home.  Summary  Adopting a healthy lifestyle and getting preventive care are important in promoting health and wellness.  Follow your health care provider's instructions about healthy diet, exercising, and getting tested or screened for diseases.  Follow your health care provider's instructions on monitoring your cholesterol and blood pressure.  This information is not intended to replace advice given to you by your health care provider. Make sure you discuss any questions you have with your health care provider.  Document Revised: 05/09/2022 Document Reviewed: 05/09/2022  Elsevier Patient Education © 2024 [employer]  Hypertension, Adult  High blood pressure (hypertension) is when the force of blood pumping through the arteries is too  "strong. The arteries are the blood vessels that carry blood from the heart throughout the body. Hypertension forces the heart to work harder to pump blood and may cause arteries to become narrow or stiff. Untreated or uncontrolled hypertension can lead to a heart attack, heart failure, a stroke, kidney disease, and other problems.  A blood pressure reading consists of a higher number over a lower number. Ideally, your blood pressure should be below 120/80. The first (\"top\") number is called the systolic pressure. It is a measure of the pressure in your arteries as your heart beats. The second (\"bottom\") number is called the diastolic pressure. It is a measure of the pressure in your arteries as the heart relaxes.  What are the causes?  The exact cause of this condition is not known. There are some conditions that result in high blood pressure.  What increases the risk?  Certain factors may make you more likely to develop high blood pressure. Some of these risk factors are under your control, including:  Smoking.  Not getting enough exercise or physical activity.  Being overweight.  Having too much fat, sugar, calories, or salt (sodium) in your diet.  Drinking too much alcohol.  Other risk factors include:  Having a personal history of heart disease, diabetes, high cholesterol, or kidney disease.  Stress.  Having a family history of high blood pressure and high cholesterol.  Having obstructive sleep apnea.  Age. The risk increases with age.  What are the signs or symptoms?  High blood pressure may not cause symptoms. Very high blood pressure (hypertensive crisis) may cause:  Headache.  Fast or irregular heartbeats (palpitations).  Shortness of breath.  Nosebleed.  Nausea and vomiting.  Vision changes.  Severe chest pain, dizziness, and seizures.  How is this diagnosed?  This condition is diagnosed by measuring your blood pressure while you are seated, with your arm resting on a flat surface, your legs uncrossed, and " your feet flat on the floor. The cuff of the blood pressure monitor will be placed directly against the skin of your upper arm at the level of your heart. Blood pressure should be measured at least twice using the same arm. Certain conditions can cause a difference in blood pressure between your right and left arms.  If you have a high blood pressure reading during one visit or you have normal blood pressure with other risk factors, you may be asked to:  Return on a different day to have your blood pressure checked again.  Monitor your blood pressure at home for 1 week or longer.  If you are diagnosed with hypertension, you may have other blood or imaging tests to help your health care provider understand your overall risk for other conditions.  How is this treated?  This condition is treated by making healthy lifestyle changes, such as eating healthy foods, exercising more, and reducing your alcohol intake. You may be referred for counseling on a healthy diet and physical activity.  Your health care provider may prescribe medicine if lifestyle changes are not enough to get your blood pressure under control and if:  Your systolic blood pressure is above 130.  Your diastolic blood pressure is above 80.  Your personal target blood pressure may vary depending on your medical conditions, your age, and other factors.  Follow these instructions at home:  Eating and drinking    Eat a diet that is high in fiber and potassium, and low in sodium, added sugar, and fat. An example of this eating plan is called the DASH diet. DASH stands for Dietary Approaches to Stop Hypertension. To eat this way:  Eat plenty of fresh fruits and vegetables. Try to fill one half of your plate at each meal with fruits and vegetables.  Eat whole grains, such as whole-wheat pasta, brown rice, or whole-grain bread. Fill about one fourth of your plate with whole grains.  Eat or drink low-fat dairy products, such as skim milk or low-fat yogurt.  Avoid  fatty cuts of meat, processed or cured meats, and poultry with skin. Fill about one fourth of your plate with lean proteins, such as fish, chicken without skin, beans, eggs, or tofu.  Avoid pre-made and processed foods. These tend to be higher in sodium, added sugar, and fat.  Reduce your daily sodium intake. Many people with hypertension should eat less than 1,500 mg of sodium a day.  Do not drink alcohol if:  Your health care provider tells you not to drink.  You are pregnant, may be pregnant, or are planning to become pregnant.  If you drink alcohol:  Limit how much you have to:  0-1 drink a day for women.  0-2 drinks a day for men.  Know how much alcohol is in your drink. In the [place] one drink equals one 12 oz bottle of beer (355 mL), one 5 oz glass of wine (148 mL), or one 1½ oz glass of hard liquor (44 mL).  Lifestyle    Work with your health care provider to maintain a healthy body weight or to lose weight. Ask what an ideal weight is for you.  Get at least 30 minutes of exercise that causes your heart to beat faster (aerobic exercise) most days of the week. Activities may include walking, swimming, or biking.  Include exercise to strengthen your muscles (resistance exercise), such as Pilates or lifting weights, as part of your weekly exercise routine. Try to do these types of exercises for 30 minutes at least 3 days a week.  Do not use any products that contain nicotine or tobacco. These products include cigarettes, chewing tobacco, and vaping devices, such as e-cigarettes. If you need help quitting, ask your health care provider.  Monitor your blood pressure at home as told by your health care provider.  Keep all follow-up visits. This is important.  Medicines  Take over-the-counter and prescription medicines only as told by your health care provider. Follow directions carefully. Blood pressure medicines must be taken as prescribed.  Do not skip doses of blood pressure medicine. Doing this puts you at risk  for problems and can make the medicine less effective.  Ask your health care provider about side effects or reactions to medicines that you should watch for.  Contact a health care provider if you:  Think you are having a reaction to a medicine you are taking.  Have headaches that keep coming back (recurring).  Feel dizzy.  Have swelling in your ankles.  Have trouble with your vision.  Get help right away if you:  Develop a severe headache or confusion.  Have unusual weakness or numbness.  Feel faint.  Have severe pain in your chest or abdomen.  Vomit repeatedly.  Have trouble breathing.  These symptoms may be an emergency. Get help right away. Call 911.  Do not wait to see if the symptoms will go away.  Do not drive yourself to the hospital.  Summary  Hypertension is when the force of blood pumping through your arteries is too strong. If this condition is not controlled, it may put you at risk for serious complications.  Your personal target blood pressure may vary depending on your medical conditions, your age, and other factors. For most people, a normal blood pressure is less than 120/80.  Hypertension is treated with lifestyle changes, medicines, or a combination of both. Lifestyle changes include losing weight, eating a healthy, low-sodium diet, exercising more, and limiting alcohol.  This information is not intended to replace advice given to you by your health care provider. Make sure you discuss any questions you have with your health care provider.  Document Revised: 10/25/2022 Document Reviewed: 10/25/2022  Elsevier Patient Education © 2024 [employer] Inc.

## 2024-10-03 NOTE — PROGRESS NOTES
Subjective     Fer Ch is a 74 y.o. male who presents to [facility] for testing follow up  (Stress, Echo, & Duplex lower extremity).    CHIEF COMPLIANT  Chief Complaint   Patient presents with    testing follow up      Stress, Echo, & Duplex lower extremity       Active Problems:  Problem List Items Addressed This Visit          Cardiac and Vasculature    Primary hypertension - Primary    Coronary artery disease involving native coronary artery of native heart without angina pectoris   Problem list  1.  Coronary artery disease  1.1  History of stenting x3 in [place] in 2015 RCA, inadequate data  1.2 Stress test June 2023 with no evidence of ischemia preserved LV function  2.  Preserved systolic function  2.1 echocardiogram 4/23: EF 55 to 60%, akinetic septal wall, normal left ventricular filling pressures  3.  Hypertension  4.  Dyslipidemia    HPI  HPI  Mr. Matthew Ch is a 74-year-old male patient who is being followed up today for chronic arterial hypertension and history of coronary artery disease and testing results.    Patient does have chronic arterial hypertension when she is being treated with carvedilol.  Today's blood pressure is controlled at 131/81 heart rate of 81.    Patient also has a history of coronary artery disease in which she had stenting to his right coronary artery in 2015 x 3 in [place].  He is on aspirin for antiplatelet therapy and atorvastatin for high intensity statin therapy.    During the review of systems that was tried to be obtained by the MA patient was very rude with multiple profane words and refused to answer the questions.  I continued to discuss his testing with him which identified an echocardiogram with an ejection fraction equal or greater than 50%, grade 1 diastolic dysfunction, trivial AI as well as a stress test that was negative for ischemia with a preserved EF of 71% with no focal wall motion abnormalities.  He also had a arterial duplex of his bilateral  lower extremities with no evidence of hemodynamically significant stenosis in either leg.  These test were explained.  After the explanation of his test patient became more belligerent using profane words saying what and this GD piss off if he were being told this and how bad I feel.  You do not know how bad I feel.  I explained to him that I only can imagine how he feels but what I can tell him is that there is no indication of recurrent blockages, no heart failure, or hemodynamically vascular disease.  Then turn to his most recent labs and started going over those with his triglycerides being significantly elevated saying that these may be more of the reasons in which she is feeling so poorly.  Patient continued to be belligerent with multiple profane words and at this time I dismiss patient from the visit and notified the  to assist patient out of building.  The evaluation was becoming nonproductive.  Patient was offered referral for further workup and treatment at another practice  [facility]  Current Outpatient Medications on File Prior to Visit   Medication Sig Dispense Refill    allopurinol (ZYLOPRIM) 100 MG tablet Take 1 tablet by mouth Every Evening for 90 days. 90 tablet 3    allopurinol (ZYLOPRIM) 300 MG tablet Take 1 tablet by mouth Every Morning for 90 days. 90 tablet 3    ASPIRIN 81 PO Take 1 tablet by mouth Daily.      carvedilol (COREG) 12.5 MG tablet Take 1 tablet by mouth 2 (Two) Times a Day. 180 tablet 3    fenofibrate (Tricor) 145 MG tablet Take 1 tablet by mouth Daily for 90 days. 90 tablet 3    magnesium oxide (MAG-OX) 400 MG tablet Take 1 tablet by mouth Daily.      multivitamin (THERAGRAN) tablet tablet Take  by mouth Daily.      Quercetin 500 MG capsule Take 355 mg by mouth Daily.      tamsulosin (FLOMAX) 0.4 MG capsule 24 hr capsule Take 1 capsule by mouth Daily.      torsemide (DEMADEX) 100 MG tablet Take 1 tablet by mouth Daily. 90 tablet 3    TRAZODONE HCL PO Take  by mouth  As Needed.      Turmeric-Ginger 150-25 MG chewable tablet Chew 2,360 mg Daily. Turmeric, Bioerine, Garlic, ginger.      vitamin C (ASCORBIC ACID) 250 MG tablet Take 2 tablets by mouth Daily.      vitamin D3 125 MCG (5000 UT) capsule capsule Take 1 capsule by mouth Daily.      Zinc 50 MG tablet Take 1 tablet by mouth Daily.       No current facility-administered medications on file prior to visit.       ALLERGIES  Atorvastatin and Potassium-containing compounds    HISTORY  Past Medical History:   Diagnosis Date    Arthritis     Asthma     Bladder cancer     Finished tx in 2022    CAD (coronary artery disease)     Diastolic congestive heart failure     Difficulty walking     Dyspnea     Gout     HL (hearing loss)     Hypertension     Idiopathic peripheral neuropathy 2023    Knee pain     Low back pain     Lymphedema     Obesity     Peripheral neuropathy     Type 2 diabetes mellitus with hyperglycemia, without long-term current use of insulin 2024       Social History     Socioeconomic History    Marital status:    Tobacco Use    Smoking status: Former     Current packs/day: 0.00     Average packs/day: 1 pack/day for 50.0 years (50.0 ttl pk-yrs)     Types: Cigarettes     Start date: 1968     Quit date: 2018     Years since quittin.5     Passive exposure: Past    Smokeless tobacco: Never   Vaping Use    Vaping status: Never Used   Substance and Sexual Activity    Alcohol use: Yes     Alcohol/week: 3.0 standard drinks of alcohol     Types: 1 Cans of beer, 2 Shots of liquor per week     Comment: Some weeks none    Drug use: Never    Sexual activity: Not Currently     Partners: Female       Family History   Problem Relation Age of Onset    Cancer Father     Alcohol abuse Brother         Alcoholic       Review of Systems   Unable to perform ROS: Other (patient refused, has been ask same questions for over 10 years)   Constitutional:  Positive for fatigue. Negative for chills,  "diaphoresis and fever.   HENT: Negative.     Eyes: Negative.  Negative for visual disturbance (double and blurry vision).   Respiratory:  Positive for shortness of breath (with activity). Negative for apnea, cough, chest tightness and wheezing.    Endocrine: Negative.    Genitourinary: Negative.    Musculoskeletal:  Positive for gait problem (uses a cane).   Allergic/Immunologic: Negative.        Objective     VITALS: /81 (BP Location: Left arm, Patient Position: Sitting, Cuff Size: Adult)   Pulse 81   Ht 177.8 cm (70\")   Wt 121 kg (266 lb 9.6 oz)   SpO2 95%   BMI 38.25 kg/m²     LABS:   Lab Results (most recent)       None            IMAGING:   No Images in the past 120 days found..    EXAM:  Physical Exam  Physical exam was not performed due to the fact of patient's profane language and aggressive behavior.  Procedure   Procedures       Assessment & Plan    Diagnosis Plan   1. Primary hypertension        2. Coronary artery disease involving native coronary artery of native heart without angina pectoris        1.  Patient's blood pressure is controlled on current blood pressure medication regimen.  No medication changes are warranted at this time.    2.  Patient does have a history of coronary artery disease in which he is on antiplatelet therapy and high intensity statin therapy.  He did go under testing and which was explained.    3.  Started to go over patient's labs and which was ordered by his PCP approximately 3 weeks ago.  Did discuss his significantly elevated triglycerides.  However patient continued to be belligerent and using profane words and being belittling in which the appointment was discontinued at that time and the  was asked to escort the patient out of the building.  Further described in HPI.    4.  He was offered referral to other practice to continue his treatment and further evaluation of potential causes of his symptoms.    No follow-ups on file.    Diagnoses and all " orders for this visit:    1. Primary hypertension (Primary)    2. Coronary artery disease involving native coronary artery of native heart without angina pectoris        Fer Ch  reports that he quit smoking about 6 years ago. His smoking use included cigarettes. He started smoking about 56 years ago. He has a 50 pack-year smoking history. He has been exposed to tobacco smoke. He has never used smokeless tobacco.      Advance Care Planning   ACP discussion was declined by the patient. Patient does not have an advance directive, declines further assistance.           MEDS ORDERED DURING VISIT:  No orders of the defined types were placed in this encounter.          This document has been electronically signed by Curtis Grissom Jr., APRN  October 9, 2024 08:17 EDT

## 2024-10-03 NOTE — LETTER
October 9, 2024     Harry Virk MD  [address]  [facility] [address]    Patient: Fer Ch   YOB: 1950   Date of Visit: 10/3/2024     Dear Dr. Sully MD:    Thank you for referring Fer Ch to me for evaluation. Below are the relevant portions of my assessment and plan of care.    If you have questions, please do not hesitate to call me. I look forward to following Fer along with you.         Sincerely,        KELSEY Cedillo        CC: No Recipients      Progress Notes:  Subjective     Fer Ch is a 74 y.o. male who presents to [facility] for testing follow up  (Stress, Echo, & Duplex lower extremity).    CHIEF COMPLIANT  Chief Complaint   Patient presents with   • testing follow up      Stress, Echo, & Duplex lower extremity       Active Problems:  Problem List Items Addressed This Visit    None  Problem list  1.  Coronary artery disease  1.1  History of stenting x3 in [place] in 2015 RCA, inadequate data  1.2 Stress test June 2023 with no evidence of ischemia preserved LV function  2.  Preserved systolic function  2.1 echocardiogram 4/23: EF 55 to 60%, akinetic septal wall, normal left ventricular filling pressures  3.  Hypertension  4.  Dyslipidemia    HPI  HPI    PRIOR MEDS  Current Outpatient Medications on File Prior to Visit   Medication Sig Dispense Refill   • allopurinol (ZYLOPRIM) 100 MG tablet Take 1 tablet by mouth Every Evening for 90 days. 90 tablet 3   • allopurinol (ZYLOPRIM) 300 MG tablet Take 1 tablet by mouth Every Morning for 90 days. 90 tablet 3   • ASPIRIN 81 PO Take 1 tablet by mouth Daily.     • carvedilol (COREG) 12.5 MG tablet Take 1 tablet by mouth 2 (Two) Times a Day. 180 tablet 3   • fenofibrate (Tricor) 145 MG tablet Take 1 tablet by mouth Daily for 90 days. 90 tablet 3   • magnesium oxide (MAG-OX) 400 MG tablet Take 1 tablet by mouth Daily.     • multivitamin (THERAGRAN) tablet tablet Take  by mouth Daily.     • Quercetin 500 MG capsule  Take 355 mg by mouth Daily.     • tamsulosin (FLOMAX) 0.4 MG capsule 24 hr capsule Take 1 capsule by mouth Daily.     • torsemide (DEMADEX) 100 MG tablet Take 1 tablet by mouth Daily. 90 tablet 3   • TRAZODONE HCL PO Take  by mouth As Needed.     • Turmeric-Ginger 150-25 MG chewable tablet Chew 2,360 mg Daily. Turmeric, Bioerine, Garlic, ginger.     • vitamin C (ASCORBIC ACID) 250 MG tablet Take 2 tablets by mouth Daily.     • vitamin D3 125 MCG (5000 UT) capsule capsule Take 1 capsule by mouth Daily.     • Zinc 50 MG tablet Take 1 tablet by mouth Daily.     • [DISCONTINUED] furosemide (LASIX) 20 MG tablet Take 1 tablet by mouth 2 (Two) Times a Day.       No current facility-administered medications on file prior to visit.       ALLERGIES  Atorvastatin and Potassium-containing compounds    HISTORY  Past Medical History:   Diagnosis Date   • Arthritis    • Asthma    • Bladder cancer     Finished tx in 2022   • CAD (coronary artery disease)    • Diastolic congestive heart failure    • Difficulty walking    • Dyspnea    • Gout    • HL (hearing loss)    • Hypertension    • Idiopathic peripheral neuropathy 2023   • Knee pain    • Low back pain    • Lymphedema    • Obesity    • Peripheral neuropathy    • Type 2 diabetes mellitus with hyperglycemia, without long-term current use of insulin 2024       Social History     Socioeconomic History   • Marital status:    Tobacco Use   • Smoking status: Former     Current packs/day: 0.00     Average packs/day: 1 pack/day for 50.0 years (50.0 ttl pk-yrs)     Types: Cigarettes     Start date: 1968     Quit date: 2018     Years since quittin.5     Passive exposure: Past   • Smokeless tobacco: Never   Vaping Use   • Vaping status: Never Used   Substance and Sexual Activity   • Alcohol use: Yes     Alcohol/week: 3.0 standard drinks of alcohol     Types: 1 Cans of beer, 2 Shots of liquor per week     Comment: Some weeks none   • Drug use:  "Never   • Sexual activity: Not Currently     Partners: Female       Family History   Problem Relation Age of Onset   • Cancer Father    • Alcohol abuse Brother         Alcoholic       Review of Systems   Unable to perform ROS: Other (patient refused, has been ask same questions for over 10 years)   Constitutional:  Positive for fatigue. Negative for chills, diaphoresis and fever.   HENT: Negative.     Eyes: Negative.  Negative for visual disturbance (double and blurry vision).   Respiratory:  Positive for shortness of breath (with activity). Negative for apnea, cough, chest tightness and wheezing.    Endocrine: Negative.    Genitourinary: Negative.    Musculoskeletal:  Positive for gait problem (uses a cane).   Allergic/Immunologic: Negative.        Objective     VITALS: /81 (BP Location: Left arm, Patient Position: Sitting, Cuff Size: Adult)   Pulse 81   Ht 177.8 cm (70\")   Wt 121 kg (266 lb 9.6 oz)   SpO2 95%   BMI 38.25 kg/m²     LABS:   Lab Results (most recent)       None            IMAGING:   No Images in the past 120 days found..    EXAM:  Physical Exam    Procedure   Procedures       Assessment & Plan   No diagnosis found.    No follow-ups on file.    There are no diagnoses linked to this encounter.    Fer Ch  reports that he quit smoking about 6 years ago. His smoking use included cigarettes. He started smoking about 56 years ago. He has a 50 pack-year smoking history. He has been exposed to tobacco smoke. He has never used smokeless tobacco.      Advance Care Planning   ACP discussion was declined by the patient. Patient does not have an advance directive, declines further assistance.           MEDS ORDERED DURING VISIT:  No orders of the defined types were placed in this encounter.          This document has been electronically signed by Curtis Grissom Jr., APRN  October 3, 2024 15:35 EDT       "

## 2024-10-04 ENCOUNTER — TELEPHONE (OUTPATIENT)
Dept: CARDIOLOGY | Facility: CLINIC | Age: 74
End: 2024-10-04
Payer: MEDICARE

## 2024-10-04 NOTE — TELEPHONE ENCOUNTER
Results addressed at office visit  10/03/24.    No hemodynamically significant stenosis in either lower extremity.  The superficial femoral arteries had approximately 20% bilaterally follow-up

## 2024-11-12 ENCOUNTER — TELEPHONE (OUTPATIENT)
Dept: CARDIOLOGY | Facility: CLINIC | Age: 74
End: 2024-11-12
Payer: MEDICARE

## 2025-02-06 RX ORDER — CARVEDILOL 6.25 MG/1
6.25 TABLET ORAL 2 TIMES DAILY WITH MEALS
Qty: 180 TABLET | Refills: 0 | OUTPATIENT
Start: 2025-02-06

## 2025-04-04 ENCOUNTER — OFFICE VISIT (OUTPATIENT)
Dept: ENDOCRINOLOGY | Facility: CLINIC | Age: 75
End: 2025-04-04
Payer: MEDICARE

## 2025-04-04 VITALS
HEIGHT: 70 IN | WEIGHT: 261 LBS | HEART RATE: 81 BPM | DIASTOLIC BLOOD PRESSURE: 73 MMHG | SYSTOLIC BLOOD PRESSURE: 124 MMHG | BODY MASS INDEX: 37.37 KG/M2 | OXYGEN SATURATION: 98 %

## 2025-04-04 DIAGNOSIS — E78.5 DYSLIPIDEMIA: ICD-10-CM

## 2025-04-04 DIAGNOSIS — I25.10 CORONARY ARTERY DISEASE INVOLVING NATIVE CORONARY ARTERY OF NATIVE HEART WITHOUT ANGINA PECTORIS: ICD-10-CM

## 2025-04-04 DIAGNOSIS — E11.42 DIABETIC POLYNEUROPATHY ASSOCIATED WITH TYPE 2 DIABETES MELLITUS: ICD-10-CM

## 2025-04-04 DIAGNOSIS — E11.65 TYPE 2 DIABETES MELLITUS WITH HYPERGLYCEMIA, WITHOUT LONG-TERM CURRENT USE OF INSULIN: Primary | ICD-10-CM

## 2025-04-04 DIAGNOSIS — I10 PRIMARY HYPERTENSION: ICD-10-CM

## 2025-04-04 LAB
EXPIRATION DATE: ABNORMAL
EXPIRATION DATE: NORMAL
GLUCOSE BLDC GLUCOMTR-MCNC: 111 MG/DL (ref 70–130)
HBA1C MFR BLD: 6.1 % (ref 4.5–5.7)
Lab: ABNORMAL
Lab: NORMAL

## 2025-04-04 PROCEDURE — 82570 ASSAY OF URINE CREATININE: CPT | Performed by: INTERNAL MEDICINE

## 2025-04-04 PROCEDURE — 82043 UR ALBUMIN QUANTITATIVE: CPT | Performed by: INTERNAL MEDICINE

## 2025-04-04 RX ORDER — TRAMADOL HYDROCHLORIDE 50 MG/1
TABLET ORAL
COMMUNITY
Start: 2025-04-04

## 2025-04-04 RX ORDER — ALLOPURINOL 100 MG/1
100 TABLET ORAL EVERY EVENING
COMMUNITY
Start: 2025-03-03

## 2025-04-04 RX ORDER — LIRAGLUTIDE 6 MG/ML
INJECTION SUBCUTANEOUS
COMMUNITY
Start: 2025-03-07

## 2025-04-04 NOTE — ASSESSMENT & PLAN NOTE
Diabetes is improving with treatment.  He has been started on victoza.  Recent FSBS have improved.  Continue current treatment regimen.  Diabetes will be reassessed in 6 months.

## 2025-04-04 NOTE — LETTER
2025     KELSEY Webb  [id number]  [place] [address]    Patient: Fer Ch   YOB: 1950   Date of Visit: 2025     Dear KELSEY Webb:       Thank you for referring Fer Ch to me for evaluation. Below are the relevant portions of my assessment and plan of care.    If you have questions, please do not hesitate to call me. I look forward to following Fer along with you.         Sincerely,        Sree Santiago MD        CC: No Recipients    Sree Santiago MD  [phone]  Sign when Signing Visit       Office Note      Date: 2025  Patient Name: Fer Ch  MRN: [id number]  : 1950    Chief Complaint   Patient presents with   • Diabetes       History of Present Illness:   Fer Ch is a 74 y.o. male who presents for Diabetes type 2. Diagnosed in: . Treated in past with diet. Current treatments: victoza. Number of insulin shots per day: none. Checks blood sugar 1 time a day. Has low blood sugar: no. Aspirin use: Yes. Statin use: No -   . ACE-I/ARB use: No -   .  Last eye exam:     Last A1c was 6.9%.    Subjective      Diabetic Complications:  Eyes: No  Kidneys: Yes - CRI  Feet: Yes - neuropathy - predates diabetes  Heart: Yes -      Diet and Exercise:  Meals per day: 3  Minutes of exercise per week: 0 mins.    Review of Systems:   Review of Systems   Constitutional: Negative.    HENT: Negative.     Eyes: Negative.    Respiratory:  Positive for shortness of breath.    Cardiovascular: Negative.    Gastrointestinal: Negative.    Endocrine: Negative.    Genitourinary: Negative.    Musculoskeletal:  Positive for gait problem and neck stiffness.   Skin:  Positive for color change and wound.   Allergic/Immunologic: Negative.    Neurological:  Positive for numbness.   Hematological: Negative.    Psychiatric/Behavioral: Negative.         The following portions of the patient's history were reviewed and updated as  "appropriate: allergies, current medications, past family history, past medical history, past social history, past surgical history, and problem list.    Objective     Visit Vitals  /73   Pulse 81   Ht 177.8 cm (70\")   Wt 118 kg (261 lb)   SpO2 98%   BMI 37.45 kg/m²       Physical Exam:  Physical Exam  Constitutional:       Appearance: He is obese.   HENT:      Head: Normocephalic and atraumatic.   Eyes:      Extraocular Movements: Extraocular movements intact.      Conjunctiva/sclera: Conjunctivae normal.   Neck:      Thyroid: No thyroid mass, thyromegaly or thyroid tenderness.   Cardiovascular:      Rate and Rhythm: Normal rate and regular rhythm.      Pulses: Normal pulses.           Dorsalis pedis pulses are 2+ on the right side and 2+ on the left side.        Posterior tibial pulses are 2+ on the right side and 2+ on the left side.      Heart sounds: Normal heart sounds.   Pulmonary:      Effort: Pulmonary effort is normal.      Breath sounds: Normal breath sounds.   Abdominal:      General: Bowel sounds are normal.      Palpations: Abdomen is soft.   Musculoskeletal:         General: Normal range of motion.      Cervical back: Normal range of motion and neck supple.   Feet:      Right foot:      Protective Sensation: 5 sites tested.  0 sites sensed.      Skin integrity: Erythema present.      Toenail Condition: Right toenails are abnormally thick. Fungal disease present.     Left foot:      Protective Sensation: 5 sites tested.  0 sites sensed.      Skin integrity: Erythema present.      Toenail Condition: Left toenails are abnormally thick. Fungal disease present.     Comments: Small scabs on top of right foot and medial aspect of right first toe  Lymphadenopathy:      Cervical: No cervical adenopathy.   Skin:     General: Skin is warm and dry.   Neurological:      General: No focal deficit present.      Mental Status: He is alert.   Psychiatric:         Mood and Affect: Mood normal.         Behavior: " "Behavior normal.         Thought Content: Thought content normal.         Judgment: Judgment normal.         Labs:    HbA1c  Hemoglobin A1C   Date Value Ref Range Status   04/04/2025 6.1 (A) 4.5 - 5.7 % Final   09/26/2023 6.80 (H) 4.80 - 5.60 % Final   11/22/2022 5.7 (H) <5.7 % Final   .    CMP  Lab Results   Component Value Date    GLUCOSE 216 (H) 09/12/2024    BUN 47 (H) 09/12/2024    CREATININE 1.92 (H) 09/12/2024    BCR 24.5 09/12/2024    K 3.3 (L) 09/12/2024    CO2 31.5 (H) 09/12/2024    CALCIUM 13.7 (C) 09/12/2024    AST 26 09/12/2024    ALT 42 (H) 09/12/2024        Lipid Panel  Lab Results   Component Value Date    HDL Cholesterol 23 (L) 09/12/2024    LDL Cholesterol  117 (H) 03/08/2024    LDL/HDL Ratio 3.25 03/08/2024    Triglycerides 1,075 (H) 09/12/2024        TSH  Lab Results   Component Value Date    TSH 1.160 09/12/2024        Hemoglobin A1C  No components found for: \"HGBA1C\"     Microalbumin/Creatinine  No results found for: \"MALBCRERATI\"        Assessment / Plan      Assessment & Plan:  Diagnoses and all orders for this visit:    1. Type 2 diabetes mellitus with hyperglycemia, without long-term current use of insulin (Primary)  Assessment & Plan:  Diabetes is improving with treatment.  He has been started on victoza.  Recent FSBS have improved.  Continue current treatment regimen.  Diabetes will be reassessed in 6 months.      2. Diabetic polyneuropathy associated with type 2 diabetes mellitus  Assessment & Plan:  Continue podiatry follow up.  On tramadol per PCP.  Didn't tolerate gabapentin.    Orders:  -     POC Glucose, Blood  -     POC Glycosylated Hemoglobin (Hb A1C)  -     Microalbumin / Creatinine Urine Ratio - Urine, Clean Catch; Future    3. Primary hypertension  Assessment & Plan:  Hypertension is stable and controlled.  Continue current treatment regimen.  Blood pressure will be reassessed in 6 months.      4. Dyslipidemia  Assessment & Plan:  Trigs were very high last year.  Now on " fenofibrate.      5. Coronary artery disease involving native coronary artery of native heart without angina pectoris  Assessment & Plan:  Continue ASA and GLP-1 RA.         Current Outpatient Medications   Medication Instructions   • allopurinol (ZYLOPRIM) 100 mg, Every Evening   • ASPIRIN 81 PO 1 tablet, Oral, Daily   • carvedilol (COREG) 12.5 mg, Oral, 2 Times Daily   • fenofibrate (TRICOR) 145 mg, Oral, Daily   • magnesium oxide (MAG-OX) 400 mg, Oral, Daily   • multivitamin (THERAGRAN) tablet tablet Oral, Daily   • Quercetin 355 mg, Oral, Daily   • tamsulosin (FLOMAX) 0.4 MG capsule 24 hr capsule 1 capsule, Oral, Daily   • torsemide (DEMADEX) 100 mg, Oral, Daily   • traMADol (ULTRAM) 50 MG tablet 1 tablet as needed Orally three times daily for 30 days   • Turmeric-Ginger 150-25 MG chewable tablet 2,360 mg, Oral, Daily, Turmeric, Bioerine, Garlic, ginger.     • Victoza 18 MG/3ML solution pen-injector injection INJECT 1.2mg SUBCUTANEOUSLY ONCE DAILY   • vitamin C (ASCORBIC ACID) 500 mg, Oral, Daily   • vitamin D3 5,000 Units, Oral, Daily   • Zinc 50 mg, Oral, Daily      Return in about 6 months (around 10/4/2025) for Recheck with A1c, CMP.    Electronically signed by: Sree Santiago MD  04/04/2025

## 2025-04-04 NOTE — PROGRESS NOTES
"     Office Note      Date: 2025  Patient Name: Fer Ch  MRN: [id number]  : 1950    Chief Complaint   Patient presents with    Diabetes       History of Present Illness:   Fer Ch is a 74 y.o. male who presents for Diabetes type 2. Diagnosed in: . Treated in past with diet. Current treatments: victoza. Number of insulin shots per day: none. Checks blood sugar 1 time a day. Has low blood sugar: no. Aspirin use: Yes. Statin use: No -   . ACE-I/ARB use: No -   .  Last eye exam:     Last A1c was 6.9%.    Subjective      Diabetic Complications:  Eyes: No  Kidneys: Yes - CRI  Feet: Yes - neuropathy - predates diabetes  Heart: Yes -      Diet and Exercise:  Meals per day: 3  Minutes of exercise per week: 0 mins.    Review of Systems:   Review of Systems   Constitutional: Negative.    HENT: Negative.     Eyes: Negative.    Respiratory:  Positive for shortness of breath.    Cardiovascular: Negative.    Gastrointestinal: Negative.    Endocrine: Negative.    Genitourinary: Negative.    Musculoskeletal:  Positive for gait problem and neck stiffness.   Skin:  Positive for color change and wound.   Allergic/Immunologic: Negative.    Neurological:  Positive for numbness.   Hematological: Negative.    Psychiatric/Behavioral: Negative.         The following portions of the patient's history were reviewed and updated as appropriate: allergies, current medications, past family history, past medical history, past social history, past surgical history, and problem list.    Objective     Visit Vitals  /73   Pulse 81   Ht 177.8 cm (70\")   Wt 118 kg (261 lb)   SpO2 98%   BMI 37.45 kg/m²       Physical Exam:  Physical Exam  Constitutional:       Appearance: He is obese.   HENT:      Head: Normocephalic and atraumatic.   Eyes:      Extraocular Movements: Extraocular movements intact.      Conjunctiva/sclera: Conjunctivae normal.   Neck:      Thyroid: No thyroid mass, thyromegaly or thyroid " tenderness.   Cardiovascular:      Rate and Rhythm: Normal rate and regular rhythm.      Pulses: Normal pulses.           Dorsalis pedis pulses are 2+ on the right side and 2+ on the left side.        Posterior tibial pulses are 2+ on the right side and 2+ on the left side.      Heart sounds: Normal heart sounds.   Pulmonary:      Effort: Pulmonary effort is normal.      Breath sounds: Normal breath sounds.   Abdominal:      General: Bowel sounds are normal.      Palpations: Abdomen is soft.   Musculoskeletal:         General: Normal range of motion.      Cervical back: Normal range of motion and neck supple.   Feet:      Right foot:      Protective Sensation: 5 sites tested.  0 sites sensed.      Skin integrity: Erythema present.      Toenail Condition: Right toenails are abnormally thick. Fungal disease present.     Left foot:      Protective Sensation: 5 sites tested.  0 sites sensed.      Skin integrity: Erythema present.      Toenail Condition: Left toenails are abnormally thick. Fungal disease present.     Comments: Small scabs on top of right foot and medial aspect of right first toe  Lymphadenopathy:      Cervical: No cervical adenopathy.   Skin:     General: Skin is warm and dry.   Neurological:      General: No focal deficit present.      Mental Status: He is alert.   Psychiatric:         Mood and Affect: Mood normal.         Behavior: Behavior normal.         Thought Content: Thought content normal.         Judgment: Judgment normal.         Labs:    HbA1c  Hemoglobin A1C   Date Value Ref Range Status   04/04/2025 6.1 (A) 4.5 - 5.7 % Final   09/26/2023 6.80 (H) 4.80 - 5.60 % Final   11/22/2022 5.7 (H) <5.7 % Final   .    CMP  Lab Results   Component Value Date    GLUCOSE 216 (H) 09/12/2024    BUN 47 (H) 09/12/2024    CREATININE 1.92 (H) 09/12/2024    BCR 24.5 09/12/2024    K 3.3 (L) 09/12/2024    CO2 31.5 (H) 09/12/2024    CALCIUM 13.7 (C) 09/12/2024    AST 26 09/12/2024    ALT 42 (H) 09/12/2024     "    Lipid Panel  Lab Results   Component Value Date    HDL Cholesterol 23 (L) 09/12/2024    LDL Cholesterol  117 (H) 03/08/2024    LDL/HDL Ratio 3.25 03/08/2024    Triglycerides 1,075 (H) 09/12/2024        TSH  Lab Results   Component Value Date    TSH 1.160 09/12/2024        Hemoglobin A1C  No components found for: \"HGBA1C\"     Microalbumin/Creatinine  No results found for: \"MALBCRERATI\"        Assessment / Plan      Assessment & Plan:  Diagnoses and all orders for this visit:    1. Type 2 diabetes mellitus with hyperglycemia, without long-term current use of insulin (Primary)  Assessment & Plan:  Diabetes is improving with treatment.  He has been started on victoza.  Recent FSBS have improved.  Continue current treatment regimen.  Diabetes will be reassessed in 6 months.      2. Diabetic polyneuropathy associated with type 2 diabetes mellitus  Assessment & Plan:  Continue podiatry follow up.  On tramadol per PCP.  Didn't tolerate gabapentin.    Orders:  -     POC Glucose, Blood  -     POC Glycosylated Hemoglobin (Hb A1C)  -     Microalbumin / Creatinine Urine Ratio - Urine, Clean Catch; Future    3. Primary hypertension  Assessment & Plan:  Hypertension is stable and controlled.  Continue current treatment regimen.  Blood pressure will be reassessed in 6 months.      4. Dyslipidemia  Assessment & Plan:  Trigs were very high last year.  Now on fenofibrate.      5. Coronary artery disease involving native coronary artery of native heart without angina pectoris  Assessment & Plan:  Continue ASA and GLP-1 RA.         Current Outpatient Medications   Medication Instructions    allopurinol (ZYLOPRIM) 100 mg, Every Evening    ASPIRIN 81 PO 1 tablet, Oral, Daily    carvedilol (COREG) 12.5 mg, Oral, 2 Times Daily    fenofibrate (TRICOR) 145 mg, Oral, Daily    magnesium oxide (MAG-OX) 400 mg, Oral, Daily    multivitamin (THERAGRAN) tablet tablet Oral, Daily    Quercetin 355 mg, Oral, Daily    tamsulosin (FLOMAX) 0.4 MG " capsule 24 hr capsule 1 capsule, Oral, Daily    torsemide (DEMADEX) 100 mg, Oral, Daily    traMADol (ULTRAM) 50 MG tablet 1 tablet as needed Orally three times daily for 30 days    Turmeric-Ginger 150-25 MG chewable tablet 2,360 mg, Oral, Daily, Turmeric, Bioerine, Garlic, ginger.      Victoza 18 MG/3ML solution pen-injector injection INJECT 1.2mg SUBCUTANEOUSLY ONCE DAILY    vitamin C (ASCORBIC ACID) 500 mg, Oral, Daily    vitamin D3 5,000 Units, Oral, Daily    Zinc 50 mg, Oral, Daily      Return in about 6 months (around 10/4/2025) for Recheck with A1c, CMP.    Electronically signed by: Sree Santiago MD  04/04/2025

## 2025-04-05 LAB
ALBUMIN UR-MCNC: <1.2 MG/DL
CREAT UR-MCNC: 23 MG/DL
MICROALBUMIN/CREAT UR: NORMAL MG/G{CREAT}

## 2025-04-06 ENCOUNTER — RESULTS FOLLOW-UP (OUTPATIENT)
Dept: ENDOCRINOLOGY | Facility: CLINIC | Age: 75
End: 2025-04-06
Payer: MEDICARE

## 2025-05-28 ENCOUNTER — TELEPHONE (OUTPATIENT)
Dept: ENDOCRINOLOGY | Facility: CLINIC | Age: 75
End: 2025-05-28

## 2025-05-28 NOTE — TELEPHONE ENCOUNTER
Hub staff attempted to follow warm transfer process and was unsuccessful     Caller: TIFFANIE DE LUNA    Relationship to patient: Emergency Contact    Best call back number: [phone]    Patient is needing: PT WAS AT PCP OFFICE AND WAS TOLD TO CALL DR. RESTREPO OFFICE AND GET AN APPT. BECAUSE MEDICATION PT WAS ON Victoza 18 MG/3ML solution pen-injector injection  IS NO LONGER COVERED AND WILL NEED A NEW MEDICATION AND AN APPT WITH DR. RESTREPO SOON

## 2025-05-29 NOTE — TELEPHONE ENCOUNTER
Patients wife notified.  She states with their insurance, they cannot afford the Ozempic.  It is going to cost over $600 and they do not qualify for patient assistance.  She states all of the shots are going to be too expensive.  States PCP was even trying to change it to another injectable and they could not afford any of them.

## 2025-08-28 RX ORDER — TORSEMIDE 100 MG/1
100 TABLET ORAL DAILY
Qty: 90 TABLET | Refills: 3 | OUTPATIENT
Start: 2025-08-28

## 2025-08-28 RX ORDER — CARVEDILOL 12.5 MG/1
12.5 TABLET ORAL 2 TIMES DAILY
Qty: 180 TABLET | Refills: 3 | OUTPATIENT
Start: 2025-08-28